# Patient Record
Sex: FEMALE | Race: WHITE | Employment: OTHER | URBAN - METROPOLITAN AREA
[De-identification: names, ages, dates, MRNs, and addresses within clinical notes are randomized per-mention and may not be internally consistent; named-entity substitution may affect disease eponyms.]

---

## 2019-12-12 PROBLEM — M25.512 CHRONIC LEFT SHOULDER PAIN: Status: ACTIVE | Noted: 2019-12-12

## 2019-12-12 PROBLEM — G89.29 CHRONIC LEFT SHOULDER PAIN: Status: ACTIVE | Noted: 2019-12-12

## 2019-12-12 PROBLEM — R05.3 CHRONIC COUGH: Status: ACTIVE | Noted: 2019-12-12

## 2019-12-12 PROBLEM — R59.0 LYMPHADENOPATHY, AXILLARY: Status: ACTIVE | Noted: 2019-12-12

## 2019-12-12 PROBLEM — E55.9 VITAMIN D DEFICIENCY: Status: ACTIVE | Noted: 2019-12-12

## 2019-12-12 PROBLEM — Z12.39 BREAST CANCER SCREENING: Status: ACTIVE | Noted: 2019-12-12

## 2019-12-12 PROBLEM — K21.9 GERD (GASTROESOPHAGEAL REFLUX DISEASE): Status: ACTIVE | Noted: 2019-12-12

## 2019-12-12 PROBLEM — E11.9 TYPE 2 DIABETES MELLITUS, WITHOUT LONG-TERM CURRENT USE OF INSULIN (HCC): Status: ACTIVE | Noted: 2019-12-12

## 2019-12-12 PROBLEM — E03.9 ACQUIRED HYPOTHYROIDISM: Status: ACTIVE | Noted: 2019-12-12

## 2019-12-12 PROBLEM — D86.9 SARCOID: Status: ACTIVE | Noted: 2019-12-12

## 2019-12-12 PROBLEM — R59.0 AXILLARY LYMPHADENOPATHY: Status: ACTIVE | Noted: 2019-12-12

## 2019-12-12 PROBLEM — E53.8 VITAMIN B12 DEFICIENCY: Status: ACTIVE | Noted: 2019-12-12

## 2019-12-12 PROBLEM — J38.00 VOCAL CORD PARALYSIS: Status: ACTIVE | Noted: 2019-12-12

## 2019-12-12 PROBLEM — I10 ESSENTIAL HYPERTENSION: Status: ACTIVE | Noted: 2019-12-12

## 2019-12-13 ENCOUNTER — HOSPITAL ENCOUNTER (OUTPATIENT)
Dept: MAMMOGRAPHY | Age: 84
Discharge: HOME | End: 2019-12-13
Attending: INTERNAL MEDICINE
Payer: MEDICARE

## 2019-12-13 DIAGNOSIS — R59.0 LYMPHADENOPATHY, AXILLARY: ICD-10-CM

## 2019-12-13 PROCEDURE — 77067 SCR MAMMO BI INCL CAD: CPT

## 2020-01-11 PROBLEM — Z12.39 BREAST CANCER SCREENING: Status: RESOLVED | Noted: 2019-12-12 | Resolved: 2020-01-11

## 2020-06-03 PROBLEM — F43.21 SITUATIONAL DEPRESSION: Status: ACTIVE | Noted: 2020-06-03

## 2020-06-16 ENCOUNTER — HOSPITAL ENCOUNTER (OUTPATIENT)
Dept: GENERAL RADIOLOGY | Age: 85
Discharge: HOME | End: 2020-06-16
Payer: MEDICARE

## 2020-06-16 DIAGNOSIS — D86.9 SARCOID: ICD-10-CM

## 2020-06-16 PROCEDURE — 71046 X-RAY EXAM CHEST 2 VIEWS: CPT

## 2020-08-17 PROBLEM — G47.33 OSA (OBSTRUCTIVE SLEEP APNEA): Status: ACTIVE | Noted: 2020-08-17

## 2020-09-01 PROBLEM — Z71.89 ADVANCED CARE PLANNING/COUNSELING DISCUSSION: Chronic | Status: ACTIVE | Noted: 2020-09-01

## 2020-09-01 PROBLEM — Z71.89 ADVANCED CARE PLANNING/COUNSELING DISCUSSION: Status: ACTIVE | Noted: 2020-09-01

## 2020-09-01 PROBLEM — Z00.00 MEDICARE ANNUAL WELLNESS VISIT, SUBSEQUENT: Status: ACTIVE | Noted: 2020-09-01

## 2020-09-01 PROBLEM — Z00.00 MEDICARE ANNUAL WELLNESS VISIT, SUBSEQUENT: Chronic | Status: ACTIVE | Noted: 2020-09-01

## 2020-10-08 PROBLEM — E78.5 DYSLIPIDEMIA: Status: ACTIVE | Noted: 2020-10-08

## 2021-01-18 ENCOUNTER — TRANSCRIBE ORDER (OUTPATIENT)
Dept: SCHEDULING | Age: 86
End: 2021-01-18

## 2021-01-18 DIAGNOSIS — Z12.31 SCREENING MAMMOGRAM FOR HIGH-RISK PATIENT: Primary | ICD-10-CM

## 2021-01-21 ENCOUNTER — HOSPITAL ENCOUNTER (OUTPATIENT)
Dept: MAMMOGRAPHY | Age: 86
Discharge: HOME | End: 2021-01-21
Attending: FAMILY MEDICINE
Payer: MEDICARE

## 2021-01-21 DIAGNOSIS — Z12.31 SCREENING MAMMOGRAM FOR HIGH-RISK PATIENT: ICD-10-CM

## 2021-01-21 PROCEDURE — 77067 SCR MAMMO BI INCL CAD: CPT

## 2021-02-16 ENCOUNTER — HOSPITAL ENCOUNTER (OUTPATIENT)
Dept: GENERAL RADIOLOGY | Age: 86
Discharge: HOME | End: 2021-02-16
Attending: FAMILY MEDICINE
Payer: MEDICARE

## 2021-02-16 DIAGNOSIS — R05.9 COUGH: ICD-10-CM

## 2021-02-16 DIAGNOSIS — D86.9 SARCOIDOSIS: ICD-10-CM

## 2021-02-16 PROCEDURE — 71046 X-RAY EXAM CHEST 2 VIEWS: CPT

## 2021-09-28 PROBLEM — M85.851 OSTEOPENIA OF NECK OF RIGHT FEMUR: Status: ACTIVE | Noted: 2021-09-28

## 2022-01-27 PROBLEM — J41.1 MUCOPURULENT CHRONIC BRONCHITIS (HCC): Status: ACTIVE | Noted: 2022-01-27

## 2022-02-04 ENCOUNTER — APPOINTMENT (OUTPATIENT)
Dept: ULTRASOUND IMAGING | Age: 87
DRG: 435 | End: 2022-02-04
Attending: FAMILY MEDICINE
Payer: MEDICARE

## 2022-02-04 ENCOUNTER — HOSPITAL ENCOUNTER (INPATIENT)
Age: 87
LOS: 5 days | Discharge: HOSPICE | DRG: 435 | End: 2022-02-09
Attending: STUDENT IN AN ORGANIZED HEALTH CARE EDUCATION/TRAINING PROGRAM | Admitting: FAMILY MEDICINE
Payer: MEDICARE

## 2022-02-04 ENCOUNTER — APPOINTMENT (OUTPATIENT)
Dept: GENERAL RADIOLOGY | Age: 87
DRG: 435 | End: 2022-02-04
Attending: STUDENT IN AN ORGANIZED HEALTH CARE EDUCATION/TRAINING PROGRAM
Payer: MEDICARE

## 2022-02-04 DIAGNOSIS — C78.02 MALIGNANT NEOPLASM METASTATIC TO BOTH LUNGS (HCC): ICD-10-CM

## 2022-02-04 DIAGNOSIS — C78.7 METASTASIS TO LIVER OF UNKNOWN ORIGIN (HCC): ICD-10-CM

## 2022-02-04 DIAGNOSIS — K76.9 LIVER LESION: ICD-10-CM

## 2022-02-04 DIAGNOSIS — D86.9 SARCOID: ICD-10-CM

## 2022-02-04 DIAGNOSIS — R63.4 UNINTENDED WEIGHT LOSS: ICD-10-CM

## 2022-02-04 DIAGNOSIS — C80.1 METASTASIS TO LIVER OF UNKNOWN ORIGIN (HCC): ICD-10-CM

## 2022-02-04 DIAGNOSIS — J18.9 PNEUMONIA OF RIGHT LOWER LOBE DUE TO INFECTIOUS ORGANISM: Primary | ICD-10-CM

## 2022-02-04 DIAGNOSIS — C78.01 MALIGNANT NEOPLASM METASTATIC TO BOTH LUNGS (HCC): ICD-10-CM

## 2022-02-04 LAB
ALBUMIN SERPL-MCNC: 2.9 G/DL (ref 3.2–4.6)
ALBUMIN/GLOB SERPL: 0.7 {RATIO} (ref 1.2–3.5)
ALP SERPL-CCNC: 280 U/L (ref 50–130)
ALT SERPL-CCNC: 30 U/L (ref 12–65)
ANION GAP SERPL CALC-SCNC: 13 MMOL/L (ref 7–16)
APPEARANCE UR: CLEAR
AST SERPL-CCNC: 29 U/L (ref 15–37)
ATRIAL RATE: 105 BPM
BACTERIA URNS QL MICRO: 0 /HPF
BASOPHILS # BLD: 0 K/UL (ref 0–0.2)
BASOPHILS NFR BLD: 0 % (ref 0–2)
BILIRUB SERPL-MCNC: 0.6 MG/DL (ref 0.2–1.1)
BILIRUB UR QL: ABNORMAL
BNP SERPL-MCNC: 711 PG/ML
BUN SERPL-MCNC: 22 MG/DL (ref 8–23)
CALCIUM SERPL-MCNC: 9.7 MG/DL (ref 8.3–10.4)
CALCULATED P AXIS, ECG09: 72 DEGREES
CALCULATED R AXIS, ECG10: 63 DEGREES
CALCULATED T AXIS, ECG11: 89 DEGREES
CHLORIDE SERPL-SCNC: 98 MMOL/L (ref 98–107)
CO2 SERPL-SCNC: 23 MMOL/L (ref 21–32)
COLOR UR: YELLOW
COVID-19 RAPID TEST, COVR: NOT DETECTED
CREAT SERPL-MCNC: 0.74 MG/DL (ref 0.6–1)
DIAGNOSIS, 93000: NORMAL
DIFFERENTIAL METHOD BLD: ABNORMAL
EOSINOPHIL # BLD: 0 K/UL (ref 0–0.8)
EOSINOPHIL NFR BLD: 0 % (ref 0.5–7.8)
ERYTHROCYTE [DISTWIDTH] IN BLOOD BY AUTOMATED COUNT: 13.5 % (ref 11.9–14.6)
EST. AVERAGE GLUCOSE BLD GHB EST-MCNC: 229 MG/DL
GLOBULIN SER CALC-MCNC: 3.9 G/DL (ref 2.3–3.5)
GLUCOSE BLD STRIP.AUTO-MCNC: 299 MG/DL (ref 65–100)
GLUCOSE SERPL-MCNC: 350 MG/DL (ref 65–100)
GLUCOSE UR STRIP.AUTO-MCNC: 1000 MG/DL
HBA1C MFR BLD: 9.6 % (ref 4.2–6.3)
HCT VFR BLD AUTO: 47.2 % (ref 35.8–46.3)
HGB BLD-MCNC: 15.8 G/DL (ref 11.7–15.4)
HGB UR QL STRIP: NEGATIVE
IMM GRANULOCYTES # BLD AUTO: 0.1 K/UL (ref 0–0.5)
IMM GRANULOCYTES NFR BLD AUTO: 1 % (ref 0–5)
KETONES UR QL STRIP.AUTO: 80 MG/DL
LACTATE SERPL-SCNC: 0.7 MMOL/L (ref 0.4–2)
LEUKOCYTE ESTERASE UR QL STRIP.AUTO: NEGATIVE
LYMPHOCYTES # BLD: 0.8 K/UL (ref 0.5–4.6)
LYMPHOCYTES NFR BLD: 6 % (ref 13–44)
MCH RBC QN AUTO: 29.3 PG (ref 26.1–32.9)
MCHC RBC AUTO-ENTMCNC: 33.5 G/DL (ref 31.4–35)
MCV RBC AUTO: 87.4 FL (ref 79.6–97.8)
MONOCYTES # BLD: 0.5 K/UL (ref 0.1–1.3)
MONOCYTES NFR BLD: 4 % (ref 4–12)
NEUTS SEG # BLD: 11.8 K/UL (ref 1.7–8.2)
NEUTS SEG NFR BLD: 89 % (ref 43–78)
NITRITE UR QL STRIP.AUTO: NEGATIVE
NRBC # BLD: 0 K/UL (ref 0–0.2)
P-R INTERVAL, ECG05: 144 MS
PH UR STRIP: 5 [PH] (ref 5–9)
PLATELET # BLD AUTO: 293 K/UL (ref 150–450)
PMV BLD AUTO: 9.7 FL (ref 9.4–12.3)
POTASSIUM SERPL-SCNC: 4.7 MMOL/L (ref 3.5–5.1)
PROCALCITONIN SERPL-MCNC: 0.13 NG/ML (ref 0–0.49)
PROT SERPL-MCNC: 6.8 G/DL (ref 6.3–8.2)
PROT UR STRIP-MCNC: NEGATIVE MG/DL
Q-T INTERVAL, ECG07: 322 MS
QRS DURATION, ECG06: 88 MS
QTC CALCULATION (BEZET), ECG08: 425 MS
RBC # BLD AUTO: 5.4 M/UL (ref 4.05–5.2)
SERVICE CMNT-IMP: ABNORMAL
SODIUM SERPL-SCNC: 134 MMOL/L (ref 136–145)
SOURCE, COVRS: NORMAL
SP GR UR REFRACTOMETRY: 1.04 (ref 1–1.02)
T4 FREE SERPL-MCNC: 1.3 NG/DL (ref 0.78–1.46)
TROPONIN-HIGH SENSITIVITY: 42.7 PG/ML (ref 0–14)
TROPONIN-HIGH SENSITIVITY: 46 PG/ML (ref 0–14)
TSH SERPL DL<=0.005 MIU/L-ACNC: 0.21 UIU/ML
UROBILINOGEN UR QL STRIP.AUTO: 0.2 EU/DL (ref 0.2–1)
VENTRICULAR RATE, ECG03: 105 BPM
WBC # BLD AUTO: 13.3 K/UL (ref 4.3–11.1)

## 2022-02-04 PROCEDURE — 81003 URINALYSIS AUTO W/O SCOPE: CPT

## 2022-02-04 PROCEDURE — 83036 HEMOGLOBIN GLYCOSYLATED A1C: CPT

## 2022-02-04 PROCEDURE — 76700 US EXAM ABDOM COMPLETE: CPT

## 2022-02-04 PROCEDURE — 65270000029 HC RM PRIVATE

## 2022-02-04 PROCEDURE — 74011250637 HC RX REV CODE- 250/637: Performed by: FAMILY MEDICINE

## 2022-02-04 PROCEDURE — 82962 GLUCOSE BLOOD TEST: CPT

## 2022-02-04 PROCEDURE — 84145 PROCALCITONIN (PCT): CPT

## 2022-02-04 PROCEDURE — 84484 ASSAY OF TROPONIN QUANT: CPT

## 2022-02-04 PROCEDURE — 83880 ASSAY OF NATRIURETIC PEPTIDE: CPT

## 2022-02-04 PROCEDURE — 84439 ASSAY OF FREE THYROXINE: CPT

## 2022-02-04 PROCEDURE — 80053 COMPREHEN METABOLIC PANEL: CPT

## 2022-02-04 PROCEDURE — 87040 BLOOD CULTURE FOR BACTERIA: CPT

## 2022-02-04 PROCEDURE — 83605 ASSAY OF LACTIC ACID: CPT

## 2022-02-04 PROCEDURE — 71046 X-RAY EXAM CHEST 2 VIEWS: CPT

## 2022-02-04 PROCEDURE — 99284 EMERGENCY DEPT VISIT MOD MDM: CPT

## 2022-02-04 PROCEDURE — 74011000250 HC RX REV CODE- 250: Performed by: FAMILY MEDICINE

## 2022-02-04 PROCEDURE — 93005 ELECTROCARDIOGRAM TRACING: CPT | Performed by: STUDENT IN AN ORGANIZED HEALTH CARE EDUCATION/TRAINING PROGRAM

## 2022-02-04 PROCEDURE — 87635 SARS-COV-2 COVID-19 AMP PRB: CPT

## 2022-02-04 PROCEDURE — 84443 ASSAY THYROID STIM HORMONE: CPT

## 2022-02-04 PROCEDURE — 74011000258 HC RX REV CODE- 258: Performed by: STUDENT IN AN ORGANIZED HEALTH CARE EDUCATION/TRAINING PROGRAM

## 2022-02-04 PROCEDURE — 74011250636 HC RX REV CODE- 250/636: Performed by: FAMILY MEDICINE

## 2022-02-04 PROCEDURE — 74011636637 HC RX REV CODE- 636/637: Performed by: FAMILY MEDICINE

## 2022-02-04 PROCEDURE — 85025 COMPLETE CBC W/AUTO DIFF WBC: CPT

## 2022-02-04 PROCEDURE — 74011250636 HC RX REV CODE- 250/636: Performed by: STUDENT IN AN ORGANIZED HEALTH CARE EDUCATION/TRAINING PROGRAM

## 2022-02-04 RX ORDER — INSULIN LISPRO 100 [IU]/ML
INJECTION, SOLUTION INTRAVENOUS; SUBCUTANEOUS
Status: DISCONTINUED | OUTPATIENT
Start: 2022-02-04 | End: 2022-02-09 | Stop reason: HOSPADM

## 2022-02-04 RX ORDER — ACETAMINOPHEN 650 MG/1
650 SUPPOSITORY RECTAL
Status: DISCONTINUED | OUTPATIENT
Start: 2022-02-04 | End: 2022-02-09 | Stop reason: HOSPADM

## 2022-02-04 RX ORDER — PREDNISONE 20 MG/1
20 TABLET ORAL
Status: DISCONTINUED | OUTPATIENT
Start: 2022-02-05 | End: 2022-02-09 | Stop reason: HOSPADM

## 2022-02-04 RX ORDER — LEVOTHYROXINE SODIUM 50 UG/1
50 TABLET ORAL
Status: DISCONTINUED | OUTPATIENT
Start: 2022-02-05 | End: 2022-02-09 | Stop reason: HOSPADM

## 2022-02-04 RX ORDER — ONDANSETRON 4 MG/1
4 TABLET, ORALLY DISINTEGRATING ORAL
Status: DISCONTINUED | OUTPATIENT
Start: 2022-02-04 | End: 2022-02-09 | Stop reason: HOSPADM

## 2022-02-04 RX ORDER — ENOXAPARIN SODIUM 100 MG/ML
40 INJECTION SUBCUTANEOUS DAILY
Status: DISCONTINUED | OUTPATIENT
Start: 2022-02-05 | End: 2022-02-09 | Stop reason: HOSPADM

## 2022-02-04 RX ORDER — FUROSEMIDE 20 MG/1
20 TABLET ORAL DAILY
Status: DISCONTINUED | OUTPATIENT
Start: 2022-02-05 | End: 2022-02-09 | Stop reason: HOSPADM

## 2022-02-04 RX ORDER — ONDANSETRON 2 MG/ML
4 INJECTION INTRAMUSCULAR; INTRAVENOUS
Status: DISCONTINUED | OUTPATIENT
Start: 2022-02-04 | End: 2022-02-09 | Stop reason: HOSPADM

## 2022-02-04 RX ORDER — GUAIFENESIN 600 MG/1
600 TABLET, EXTENDED RELEASE ORAL 2 TIMES DAILY
Status: DISCONTINUED | OUTPATIENT
Start: 2022-02-04 | End: 2022-02-09 | Stop reason: HOSPADM

## 2022-02-04 RX ORDER — ASPIRIN 81 MG/1
81 TABLET ORAL DAILY
Status: DISCONTINUED | OUTPATIENT
Start: 2022-02-05 | End: 2022-02-09 | Stop reason: HOSPADM

## 2022-02-04 RX ORDER — ACETAMINOPHEN 325 MG/1
650 TABLET ORAL
Status: DISCONTINUED | OUTPATIENT
Start: 2022-02-04 | End: 2022-02-09 | Stop reason: HOSPADM

## 2022-02-04 RX ORDER — POLYETHYLENE GLYCOL 3350 17 G/17G
17 POWDER, FOR SOLUTION ORAL DAILY PRN
Status: DISCONTINUED | OUTPATIENT
Start: 2022-02-04 | End: 2022-02-09 | Stop reason: HOSPADM

## 2022-02-04 RX ORDER — LORAZEPAM 0.5 MG/1
0.5 TABLET ORAL
Status: DISCONTINUED | OUTPATIENT
Start: 2022-02-04 | End: 2022-02-04 | Stop reason: SDUPTHER

## 2022-02-04 RX ORDER — VALSARTAN 160 MG/1
160 TABLET ORAL
Status: DISCONTINUED | OUTPATIENT
Start: 2022-02-04 | End: 2022-02-09 | Stop reason: HOSPADM

## 2022-02-04 RX ORDER — GUAIFENESIN 600 MG/1
600 TABLET, EXTENDED RELEASE ORAL 2 TIMES DAILY
Status: DISCONTINUED | OUTPATIENT
Start: 2022-02-04 | End: 2022-02-04 | Stop reason: SDUPTHER

## 2022-02-04 RX ORDER — SODIUM CHLORIDE 0.9 % (FLUSH) 0.9 %
5-40 SYRINGE (ML) INJECTION AS NEEDED
Status: DISCONTINUED | OUTPATIENT
Start: 2022-02-04 | End: 2022-02-09 | Stop reason: HOSPADM

## 2022-02-04 RX ORDER — LORAZEPAM 0.5 MG/1
0.25 TABLET ORAL
Status: DISCONTINUED | OUTPATIENT
Start: 2022-02-04 | End: 2022-02-09 | Stop reason: HOSPADM

## 2022-02-04 RX ORDER — AZITHROMYCIN 250 MG/1
500 TABLET, FILM COATED ORAL DAILY
Status: COMPLETED | OUTPATIENT
Start: 2022-02-05 | End: 2022-02-09

## 2022-02-04 RX ORDER — LANOLIN ALCOHOL/MO/W.PET/CERES
1000 CREAM (GRAM) TOPICAL DAILY
Status: DISCONTINUED | OUTPATIENT
Start: 2022-02-05 | End: 2022-02-09 | Stop reason: HOSPADM

## 2022-02-04 RX ORDER — ROSUVASTATIN CALCIUM 5 MG/1
10 TABLET, COATED ORAL
Status: DISCONTINUED | OUTPATIENT
Start: 2022-02-04 | End: 2022-02-09 | Stop reason: HOSPADM

## 2022-02-04 RX ORDER — SODIUM CHLORIDE 0.9 % (FLUSH) 0.9 %
5-40 SYRINGE (ML) INJECTION EVERY 8 HOURS
Status: DISCONTINUED | OUTPATIENT
Start: 2022-02-04 | End: 2022-02-09 | Stop reason: HOSPADM

## 2022-02-04 RX ORDER — PANTOPRAZOLE SODIUM 40 MG/1
40 TABLET, DELAYED RELEASE ORAL 2 TIMES DAILY
Status: DISCONTINUED | OUTPATIENT
Start: 2022-02-04 | End: 2022-02-09 | Stop reason: HOSPADM

## 2022-02-04 RX ORDER — FLUTICASONE PROPIONATE 110 UG/1
1 AEROSOL, METERED RESPIRATORY (INHALATION)
Status: DISCONTINUED | OUTPATIENT
Start: 2022-02-04 | End: 2022-02-09 | Stop reason: HOSPADM

## 2022-02-04 RX ORDER — DULOXETIN HYDROCHLORIDE 60 MG/1
60 CAPSULE, DELAYED RELEASE ORAL DAILY
Status: DISCONTINUED | OUTPATIENT
Start: 2022-02-05 | End: 2022-02-09 | Stop reason: HOSPADM

## 2022-02-04 RX ADMIN — ROSUVASTATIN CALCIUM 10 MG: 5 TABLET, FILM COATED ORAL at 21:27

## 2022-02-04 RX ADMIN — INSULIN LISPRO 6 UNITS: 100 INJECTION, SOLUTION INTRAVENOUS; SUBCUTANEOUS at 21:26

## 2022-02-04 RX ADMIN — CEFTRIAXONE 1 G: 1 INJECTION, POWDER, FOR SOLUTION INTRAMUSCULAR; INTRAVENOUS at 17:18

## 2022-02-04 RX ADMIN — SODIUM CHLORIDE, PRESERVATIVE FREE 10 ML: 5 INJECTION INTRAVENOUS at 17:20

## 2022-02-04 RX ADMIN — GUAIFENESIN 600 MG: 600 TABLET, EXTENDED RELEASE ORAL at 21:28

## 2022-02-04 RX ADMIN — VALSARTAN 160 MG: 160 TABLET, FILM COATED ORAL at 21:27

## 2022-02-04 RX ADMIN — AZITHROMYCIN MONOHYDRATE 500 MG: 500 INJECTION, POWDER, LYOPHILIZED, FOR SOLUTION INTRAVENOUS at 17:23

## 2022-02-04 RX ADMIN — SODIUM CHLORIDE 1000 ML: 900 INJECTION, SOLUTION INTRAVENOUS at 15:10

## 2022-02-04 RX ADMIN — SODIUM CHLORIDE, PRESERVATIVE FREE 10 ML: 5 INJECTION INTRAVENOUS at 21:39

## 2022-02-04 NOTE — ED TRIAGE NOTES
Pt arrived to ED via EMS from home. Per EMS pt c/o generalized weakness x1 month. Per EMS pt O2 sat 92% on room air and increased to 97% on 2L.     Per EMS    140/70  110  97.9

## 2022-02-04 NOTE — H&P
Hospitalist History and Physical   Admit Date:  2022  2:25 PM   Name:  Desmond Holt   Age:  81 y.o. Sex:  female  :  1935   MRN:  [id number]   Room:  HA/A    Presenting Complaint: Fatigue    Reason(s) for Admission: CAP (community acquired pneumonia) [J18.9]     History of Present Illness: Desmond Holt is a 81 y.o. female with medical history of sarcoidosis, vocal cord paralysis, hypothyroidism, hypertension, type 2 diabetes mellitus presented to ED with worsening generalized weakness. Son was at bedside and reports patient symptoms started around thanksgiving and she has been progressively worsened over the past several days. She has become increasingly fatigued and unable to do her daily activities which she used to do before. Also reports abdominal pain and described as pain is wrapping around her abdomen, associated with nausea and poor oral intake. Son's report about 20 pound unintentional weight loss since Thanksgiving. Patient is following PCP who has ordered outpatient ultrasound secondary to abdominal pain. Patient endorses ongoing cough which she attributes to her sarcoidosis. Complaining of subjective fevers. Denies chest pain, palpitation. Patient is vaccinated against COVID but has not received booster yet. In the ED patient was tachycardic. Labs notable for WBC 13.3, hemoglobin 13.8, platelets 498, sodium 134, potassium 4.7, creatinine 0.74, BUN 22, troponin XX 6 and proBNP 711. Chest x-ray shows rt lower lobe pneumonia. Hospitalist consulted for admission. Review of Systems:  10 systems reviewed and negative except as noted in HPI.   Assessment & Plan:     Community-acquired pneumonia:  Rapid Covid negative  Chest x-ray showed possible RLL  Pneumonia  Sputum culture ordered   blood cultures ordered  Start patient on ceftriaxone/azithromycin for CAP coverage  Supplemental oxygen as needed    Diabetes mellitus:   Hold oral hypoglycemic  Start patient on sliding scale  Check A1c    Abdominal pain:  Ultrasound abdomen     Elevated troponin:  Likely demand ischemia  EKG reviewed  Trend troponin  Patient currently asymptomatic    Debility/poor intake/unintentional weight loss:  PT/OT  Nutrition consult    Hypertension/hyperlipidemia:  Continue valsartan, statin and furosemide    Hypothyroidism:  Continue levothyroxine    Sarcoidosis:   At home on 20 mg prednisone daily, will continue     Depression:  Continue duloxetine      Dispo/Discharge Planning: Anticipate discharge in 2 to 3 days     Diet: ADULT DIET Regular; 3 carb choices (45 gm/meal)  VTE ppx: Lovenox  Code status: Full Code    Hospital Problems as of 2/4/2022 Date Reviewed: 12/30/2021          Codes Class Noted - Resolved POA    * (Principal) CAP (community acquired pneumonia) ICD-10-CM: J18.9  ICD-9-CM: 655  2/4/2022 - Present Unknown        Dyslipidemia ICD-10-CM: E78.5  ICD-9-CM: 272.4  10/8/2020 - Present Yes        Acquired hypothyroidism ICD-10-CM: E03.9  ICD-9-CM: 244.9  12/12/2019 - Present Yes        Type 2 diabetes mellitus, without long-term current use of insulin (HCC) ICD-10-CM: E11.9  ICD-9-CM: 250.00  12/12/2019 - Present Yes        Vocal cord paralysis ICD-10-CM: J38.00  ICD-9-CM: 478.30  12/12/2019 - Present Yes              Past History:  Past Medical History:   Diagnosis Date    Diabetes mellitus, type 2 ([address]     Hx of sarcoidosis     Left wrist dislocation 07/2021    no surgery, just re-set     Past Surgical History:   Procedure Laterality Date    HX CATARACT REMOVAL Bilateral     HX PARTIAL THYROIDECTOMY      IMPLANT BREAST SILICONE/EQ Bilateral     removed 2000      No Known Allergies   Social History     Tobacco Use    Smoking status: Never Smoker    Smokeless tobacco: Never Used   Substance Use Topics    Alcohol use: Never      Family History   Problem Relation Age of Onset    Heart Disease Mother     Heart Disease Father     Cancer Brother     Liver Disease Brother Family history reviewed and negative except as noted above. Immunization History   Administered Date(s) Administered    COVID-19, Pfizer Purple top, DILUTE for use, 12+ yrs, 30mcg/0.3mL dose 04/02/2021, 04/27/2021    Influenza High Dose Vaccine PF 10/01/2019, 10/01/2021    Influenza, Quadrivalent, Adjuvanted (>65 Yrs FLUAD QUAD 21381) 10/27/2020     Prior to Admit Medications:  Current Outpatient Medications   Medication Instructions    acetaminophen/diphenhydramine (TYLENOL PM PO) Oral    aspirin delayed-release 81 mg tablet Oral, DAILY    cpap machine kit Does Not Apply    cyanocobalamin (VITAMIN B-12) 1,000 mcg, Oral, DAILY    DISABLED PLACARD (DISABLED PLACARD) DMV Permanent. Inability to walk without the use of, or assistance from a brace, cane, crutch, or another person.  DULoxetine (CYMBALTA) 60 mg, Oral, DAILY    fluticasone furoate (Arnuity Ellipta) 100 mcg/actuation dsdv inhaler 1 Puff, Inhalation, DAILY, RINSE MOUTH WELL AFTER USE    furosemide (LASIX) 20 mg, Oral, DAILY    guaiFENesin ER (MUCINEX) 600 mg, Oral, 2 TIMES DAILY    levothyroxine (SYNTHROID) 50 mcg, Oral, DAILY BEFORE BREAKFAST    LORazepam (ATIVAN) 0.5 mg tablet 1/2 tablet twice a day    Oxygen 3 lpm with CPAP.  pantoprazole (PROTONIX) 40 mg, Oral, 2 TIMES DAILY    pioglitazone (ACTOS) 15 mg, Oral, DAILY    potassium chloride (KLOR-CON M10) 10 mEq tablet 10 mEq, Oral, DAILY    predniSONE (DELTASONE) 10 mg tablet 40mg daily x 1 week, 30mg daily x 1 week, then 20mg daily until seen back in the office.     rosuvastatin (CRESTOR) 10 mg, Oral, EVERY BEDTIME    SITagliptin (JANUVIA) 100 mg, Oral, DAILY    valsartan (DIOVAN) 160 mg, Oral, EVERY BEDTIME       Objective:     Patient Vitals for the past 24 hrs:   Temp Pulse Resp BP SpO2   02/04/22 1424 98 °F (36.7 °C) (!) 110 16 (!) 156/73 97 %     Oxygen Therapy  O2 Sat (%): 97 % (02/04/22 1424)  O2 Device: None (Room air) (02/04/22 0422)    Estimated body mass index is 20.98 kg/m² as calculated from the following:    Height as of this encounter: 5' 6\" (1.676 m). Weight as of this encounter: 59 kg (130 lb). No intake or output data in the 24 hours ending 02/04/22 1724      Physical Exam:    Blood pressure (!) 156/73, pulse (!) 110, temperature 98 °F (36.7 °C), resp. rate 16, height 5' 6\" (1.676 m), weight 59 kg (130 lb), SpO2 97 %. General:    No overt distress  Head:  Normocephalic, atraumatic  Eyes:  Sclerae appear normal.  Pupils equally round. ENT:  Nares appear normal, no drainage. Moist oral mucosa  Neck:  No restricted ROM. Trachea midline   CV:   RRR. No m/r/g. No jugular venous distension. Lungs:             RT lower lung crackles  Abdomen: Bowel sounds present. Soft, nontender, nondistended. Extremities: No cyanosis or clubbing. No edema  Skin:     No rashes and normal coloration. Warm and dry. Neuro:  CN II-XII grossly intact. Sensation intact. A&Ox3  Psych:  Normal mood and affect. I have reviewed ordered lab tests and independently visualized imaging below:    Last 24hr Labs:  Recent Results (from the past 24 hour(s))   CBC WITH AUTOMATED DIFF    Collection Time: 02/04/22  3:03 PM   Result Value Ref Range    WBC 13.3 (H) 4.3 - 11.1 K/uL    RBC 5.40 (H) 4.05 - 5.2 M/uL    HGB 15.8 (H) 11.7 - 15.4 g/dL    HCT 47.2 (H) 35.8 - 46.3 %    MCV 87.4 79.6 - 97.8 FL    MCH 29.3 26.1 - 32.9 PG    MCHC 33.5 31.4 - 35.0 g/dL    RDW 13.5 11.9 - 14.6 %    PLATELET 790 820 - 787 K/uL    MPV 9.7 9.4 - 12.3 FL    ABSOLUTE NRBC 0.00 0.0 - 0.2 K/uL    DF AUTOMATED      NEUTROPHILS 89 (H) 43 - 78 %    LYMPHOCYTES 6 (L) 13 - 44 %    MONOCYTES 4 4.0 - 12.0 %    EOSINOPHILS 0 (L) 0.5 - 7.8 %    BASOPHILS 0 0.0 - 2.0 %    IMMATURE GRANULOCYTES 1 0.0 - 5.0 %    ABS. NEUTROPHILS 11.8 (H) 1.7 - 8.2 K/UL    ABS. LYMPHOCYTES 0.8 0.5 - 4.6 K/UL    ABS. MONOCYTES 0.5 0.1 - 1.3 K/UL    ABS. EOSINOPHILS 0.0 0.0 - 0.8 K/UL    ABS. BASOPHILS 0.0 0.0 - 0.2 K/UL    ABS. IMM. GRANS. 0.1 0.0 - 0.5 K/UL   METABOLIC PANEL, COMPREHENSIVE    Collection Time: 02/04/22  3:03 PM   Result Value Ref Range    Sodium 134 (L) 136 - 145 mmol/L    Potassium 4.7 3.5 - 5.1 mmol/L    Chloride 98 98 - 107 mmol/L    CO2 23 21 - 32 mmol/L    Anion gap 13 7 - 16 mmol/L    Glucose 350 (H) 65 - 100 mg/dL    BUN 22 8 - 23 MG/DL    Creatinine 0.74 0.6 - 1.0 MG/DL    GFR est AA >60 >60 ml/min/1.73m2    GFR est non-AA >60 >60 ml/min/1.73m2    Calcium 9.7 8.3 - 10.4 MG/DL    Bilirubin, total 0.6 0.2 - 1.1 MG/DL    ALT (SGPT) 30 12 - 65 U/L    AST (SGOT) 29 15 - 37 U/L    Alk. phosphatase 280 (H) 50 - 130 U/L    Protein, total 6.8 6.3 - 8.2 g/dL    Albumin 2.9 (L) 3.2 - 4.6 g/dL    Globulin 3.9 (H) 2.3 - 3.5 g/dL    A-G Ratio 0.7 (L) 1.2 - 3.5     TSH 3RD GENERATION    Collection Time: 02/04/22  3:03 PM   Result Value Ref Range    TSH 0.212 uIU/mL   T4, FREE    Collection Time: 02/04/22  3:03 PM   Result Value Ref Range    T4, Free 1.3 0.78 - 1.46 NG/DL   NT-PRO BNP    Collection Time: 02/04/22  3:03 PM   Result Value Ref Range    NT pro- (H) <450 PG/ML   TROPONIN-HIGH SENSITIVITY    Collection Time: 02/04/22  3:03 PM   Result Value Ref Range    Troponin-High Sensitivity 46.0 (H) 0 - 14 pg/mL   COVID-19 RAPID TEST    Collection Time: 02/04/22  3:15 PM   Result Value Ref Range    Specimen source NASAL SWAB      COVID-19 rapid test Not detected NOTD     EKG, 12 LEAD, INITIAL    Collection Time: 02/04/22  3:15 PM   Result Value Ref Range    Ventricular Rate 105 BPM    Atrial Rate 105 BPM    P-R Interval 144 ms    QRS Duration 88 ms    Q-T Interval 322 ms    QTC Calculation (Bezet) 425 ms    Calculated P Axis 72 degrees    Calculated R Axis 63 degrees    Calculated T Axis 89 degrees    Diagnosis       !! AGE AND GENDER SPECIFIC ECG ANALYSIS !!   Sinus tachycardia  Nonspecific ST abnormality  No previous ECGs available  Confirmed by [facility]  MD (), ADY YAO ([id number]) on 2/4/2022 5:21:04 PM     LACTIC ACID Collection Time: 02/04/22  4:33 PM   Result Value Ref Range    Lactic acid 0.7 0.4 - 2.0 MMOL/L       All Micro Results     Procedure Component Value Units Date/Time    CULTURE, RESPIRATORY/SPUTUM/BRONCH Amye Chroman [[id number]]     Order Status: Sent Specimen: Sputum     CULTURE, BLOOD [[id number]] Collected: 02/04/22 1633    Order Status: Completed Specimen: Blood Updated: 02/04/22 1655    CULTURE, BLOOD [[id number]] Collected: 02/04/22 1635    Order Status: Completed Specimen: Blood Updated: 02/04/22 1655    COVID-19 RAPID TEST [[id number]] Collected: 02/04/22 1515    Order Status: Completed Specimen: Nasopharyngeal Updated: 02/04/22 1544     Specimen source NASAL SWAB        COVID-19 rapid test Not detected        Comment:      The specimen is NEGATIVE for SARS-CoV-2, the novel coronavirus associated with COVID-19. A negative result does not rule out COVID-19. This test has been authorized by the FDA under an Emergency Use Authorization (EUA) for use by authorized laboratories. Fact sheet for Healthcare Providers: Revert.IOdaKabbage.[email or web address].nz  Fact sheet for Patients: Revert.IOdate.co.nz       Methodology: Isothermal Nucleic Acid Amplification               Other Studies:  XR CHEST PA LAT    Result Date: 2/4/2022  CHEST X-RAY, 2 views. INDICATION:  Chest pain. TECHNIQUE: PA and lateral views. COMPARISON: February 2021. FINDINGS: Lungs: Lobar consolidation right lower lobe. Left lung is clear. . Costophrenic angles: are sharp. Heart size: is normal. Pulmonary vasculature: is unremarkable. Aorta: Unremarkable. Included portion of the upper abdomen: is unremarkable. Bones: No gross bony lesions. Other: None. Right lower lobar consolidation suggesting pneumonia.        Medications Administered     sodium chloride 0.9 % bolus infusion 1,000 mL     Admin Date  02/04/2022 Action  New Bag Dose  1,000 mL Rate  1,000 mL/hr Route  IntraVENous Administered By  Lui Gonzales RN Signed:  Basilio Astudillo MD    Part of this note may have been written by using a voice dictation software. The note has been proof read but may still contain some grammatical/other typographical errors.

## 2022-02-04 NOTE — ED NOTES
TRANSFER - OUT REPORT:    Verbal report given to Gurdeep Farmer RN on [employer]  being transferred to room 348 for routine progression of care       Report consisted of patients Situation, Background, Assessment and   Recommendations(SBAR). Information from the following report(s) SBAR was reviewed with the receiving nurse. Lines:   Peripheral IV Right Antecubital (Active)       Peripheral IV 02/04/22 Right Antecubital (Active)   Site Assessment Clean, dry, & intact 02/04/22 1504   Phlebitis Assessment 0 02/04/22 1504   Infiltration Assessment 0 02/04/22 1504   Dressing Status Clean, dry, & intact 02/04/22 1504   Dressing Type Gauze;Tape;Transparent 02/04/22 1504   Hub Color/Line Status Pink;Flushed 02/04/22 1504   Action Taken Blood drawn 02/04/22 1504        Opportunity for questions and clarification was provided.       Patient transported with:   Registered Nurse

## 2022-02-04 NOTE — ED PROVIDER NOTES
60-year-old female patient presenting with vague complaints of fatigue, weakness abdominal discomfort and swelling under her chin. Patient states she has been feeling ill for a month at least but has worsened over the past several days. Her son is now at bedside and states that patient has lost approximately 20 pounds since Thanksgiving. She has become increasingly fatigued and he states that he was concerned today that patient was so weak that \"she might not bounce back\". Patient denies quantified fever but does report occasional bouts of diaphoresis. She reports an ongoing cough and congestion related to sarcoid that she states is unchanged. She reports no nausea or vomiting. She reports difficulty passing stool at times but states when she does it is loose, free of bloody black or tarry appearance. She also reports difficulty urinating at times but denies dysuria. Patient states she is generally weak which prevents her from getting around as she normally does. She has been seen and followed by Dr. Kranthi Sparks of primary care who is ordered outpatient ultrasound secondary to elevated liver enzymes. This scheduled for next week.            Past Medical History:   Diagnosis Date    Diabetes mellitus, type 2 ([address]     Hx of sarcoidosis     Left wrist dislocation 07/2021    no surgery, just re-set       Past Surgical History:   Procedure Laterality Date    HX CATARACT REMOVAL Bilateral     HX PARTIAL THYROIDECTOMY      IMPLANT BREAST SILICONE/EQ Bilateral     removed 2000         Family History:   Problem Relation Age of Onset    Heart Disease Mother     Heart Disease Father     Cancer Brother     Liver Disease Brother        Social History     Socioeconomic History    Marital status:      Spouse name: Not on file    Number of children: Not on file    Years of education: Not on file    Highest education level: Not on file   Occupational History    Not on file   Tobacco Use    Smoking status: Never Smoker    Smokeless tobacco: Never Used   Vaping Use    Vaping Use: Never used   Substance and Sexual Activity    Alcohol use: Never    Drug use: Never    Sexual activity: Not on file   Other Topics Concern    Not on file   Social History Narrative    Not on file     Social Determinants of Health     Financial Resource Strain:     Difficulty of Paying Living Expenses: Not on file   Food Insecurity:     Worried About Running Out of Food in the Last Year: Not on file    Tamera of Food in the Last Year: Not on file   Transportation Needs:     Lack of Transportation (Medical): Not on file    Lack of Transportation (Non-Medical): Not on file   Physical Activity:     Days of Exercise per Week: Not on file    Minutes of Exercise per Session: Not on file   Stress:     Feeling of Stress : Not on file   Social Connections:     Frequency of Communication with Friends and Family: Not on file    Frequency of Social Gatherings with Friends and Family: Not on file    Attends [employer] Services: Not on file    Active Member of [address] [place] [address] or Organizations: Not on file    Attends Club or Organization Meetings: Not on file    Marital Status: Not on file   Intimate Partner Violence:     Fear of Current or Ex-Partner: Not on file    Emotionally Abused: Not on file    Physically Abused: Not on file    Sexually Abused: Not on file   Housing Stability:     Unable to Pay for Housing in the Last Year: Not on file    Number of [place] in the Last Year: Not on file    Unstable Housing in the Last Year: Not on file         ALLERGIES: Patient has no known allergies. Review of Systems   Constitutional: Positive for diaphoresis and fatigue. Negative for chills and fever. HENT: Negative for congestion, sneezing and sore throat. Eyes: Negative for visual disturbance. Respiratory: Negative for cough, chest tightness, shortness of breath and wheezing.     Cardiovascular: Negative for chest pain and leg swelling. Gastrointestinal: Positive for abdominal pain and diarrhea. Negative for blood in stool, nausea and vomiting. Endocrine: Negative for polyuria. Genitourinary: Positive for difficulty urinating. Negative for dysuria, flank pain, hematuria and urgency. Musculoskeletal: Negative for back pain, myalgias, neck pain and neck stiffness. Skin: Negative for color change and rash. Neurological: Positive for weakness. Negative for dizziness, syncope, speech difficulty, light-headedness, numbness and headaches. Psychiatric/Behavioral: Negative for behavioral problems. All other systems reviewed and are negative. Vitals:    02/04/22 1424   BP: (!) 156/73   Pulse: (!) 110   Resp: 16   Temp: 98 °F (36.7 °C)   SpO2: 97%   Weight: 59 kg (130 lb)   Height: 5' 6\" (1.676 m)            Physical Exam  Vitals and nursing note reviewed. Constitutional:       General: She is not in acute distress. Appearance: She is well-developed. She is not diaphoretic. Comments: Alert and oriented to person place and time. No acute distress, speaks in clear, fluid sentences. HENT:      Head: Normocephalic and atraumatic. Right Ear: External ear normal.      Left Ear: External ear normal.      Nose: Nose normal.   Eyes:      Pupils: Pupils are equal, round, and reactive to light. Cardiovascular:      Rate and Rhythm: Normal rate and regular rhythm. Heart sounds: Normal heart sounds. No murmur heard. No friction rub. No gallop. Pulmonary:      Effort: Pulmonary effort is normal. No respiratory distress. Breath sounds: Normal breath sounds. No stridor. No decreased breath sounds, wheezing, rhonchi or rales. Chest:      Chest wall: No tenderness. Abdominal:      General: There is no distension. Palpations: Abdomen is soft. There is no mass. Tenderness: There is no abdominal tenderness. There is no guarding or rebound. Hernia: No hernia is present.       Comments: Abdomen is soft and flat, no rebound guarding or distention. Nonfocal exam.   Musculoskeletal:         General: No tenderness or deformity. Normal range of motion. Cervical back: Normal range of motion. Skin:     General: Skin is warm and dry. Neurological:      Mental Status: She is alert and oriented to person, place, and time. Cranial Nerves: No cranial nerve deficit.           MDM  Number of Diagnoses or Management Options     Amount and/or Complexity of Data Reviewed  Clinical lab tests: reviewed and ordered  Tests in the radiology section of CPT®: reviewed and ordered  Tests in the medicine section of CPT®: ordered and reviewed  Independent visualization of images, tracings, or specimens: yes    Risk of Complications, Morbidity, and/or Mortality  Presenting problems: moderate  Diagnostic procedures: low  Management options: moderate    Patient Progress  Patient progress: stable         Procedures

## 2022-02-04 NOTE — PROGRESS NOTES
02/04/22 1756   Dual Skin Pressure Injury Assessment   Dual Skin Pressure Injury Assessment WDL   Second Care Provider (Based on [address] [place] [address]) Patience MCPHERSON   Skin Integumentary   Skin Integumentary (WDL) X    Pressure  Injury Documentation No Pressure Injury Noted-Pressure Ulcer Prevention Initiated   Skin Color Pale;Ecchymosis (comment)   Skin Condition/Temp Dry;Fragile; Poor turgor (comment)   Skin Integrity Scars (comment); Excoriation; Other (comment)  (bruises)   Turgor Epidermis thin w/ loss of subcut tissue   Hair Growth Present   Nails X   Varicosities Absent   Exceptions to WDL Thick

## 2022-02-05 ENCOUNTER — APPOINTMENT (OUTPATIENT)
Dept: CT IMAGING | Age: 87
DRG: 435 | End: 2022-02-05
Attending: FAMILY MEDICINE
Payer: MEDICARE

## 2022-02-05 LAB
ANION GAP SERPL CALC-SCNC: 9 MMOL/L (ref 7–16)
BASOPHILS # BLD: 0 K/UL (ref 0–0.2)
BASOPHILS NFR BLD: 0 % (ref 0–2)
BUN SERPL-MCNC: 19 MG/DL (ref 8–23)
CALCIUM SERPL-MCNC: 8.9 MG/DL (ref 8.3–10.4)
CEA SERPL-MCNC: 4.6 NG/ML (ref 0–3)
CHLORIDE SERPL-SCNC: 100 MMOL/L (ref 98–107)
CO2 SERPL-SCNC: 27 MMOL/L (ref 21–32)
CREAT SERPL-MCNC: 0.73 MG/DL (ref 0.6–1)
DIFFERENTIAL METHOD BLD: ABNORMAL
EOSINOPHIL # BLD: 0 K/UL (ref 0–0.8)
EOSINOPHIL NFR BLD: 0 % (ref 0.5–7.8)
ERYTHROCYTE [DISTWIDTH] IN BLOOD BY AUTOMATED COUNT: 13.8 % (ref 11.9–14.6)
GLUCOSE BLD STRIP.AUTO-MCNC: 196 MG/DL (ref 65–100)
GLUCOSE BLD STRIP.AUTO-MCNC: 259 MG/DL (ref 65–100)
GLUCOSE BLD STRIP.AUTO-MCNC: 331 MG/DL (ref 65–100)
GLUCOSE BLD STRIP.AUTO-MCNC: 357 MG/DL (ref 65–100)
GLUCOSE SERPL-MCNC: 219 MG/DL (ref 65–100)
HCT VFR BLD AUTO: 45.1 % (ref 35.8–46.3)
HGB BLD-MCNC: 14.9 G/DL (ref 11.7–15.4)
IMM GRANULOCYTES # BLD AUTO: 0.1 K/UL (ref 0–0.5)
IMM GRANULOCYTES NFR BLD AUTO: 1 % (ref 0–5)
LYMPHOCYTES # BLD: 1.9 K/UL (ref 0.5–4.6)
LYMPHOCYTES NFR BLD: 16 % (ref 13–44)
MCH RBC QN AUTO: 29.6 PG (ref 26.1–32.9)
MCHC RBC AUTO-ENTMCNC: 33 G/DL (ref 31.4–35)
MCV RBC AUTO: 89.5 FL (ref 79.6–97.8)
MONOCYTES # BLD: 0.8 K/UL (ref 0.1–1.3)
MONOCYTES NFR BLD: 7 % (ref 4–12)
NEUTS SEG # BLD: 9 K/UL (ref 1.7–8.2)
NEUTS SEG NFR BLD: 76 % (ref 43–78)
NRBC # BLD: 0 K/UL (ref 0–0.2)
PLATELET # BLD AUTO: 287 K/UL (ref 150–450)
PMV BLD AUTO: 10.2 FL (ref 9.4–12.3)
POTASSIUM SERPL-SCNC: 4.7 MMOL/L (ref 3.5–5.1)
RBC # BLD AUTO: 5.04 M/UL (ref 4.05–5.2)
SERVICE CMNT-IMP: ABNORMAL
SODIUM SERPL-SCNC: 136 MMOL/L (ref 136–145)
WBC # BLD AUTO: 11.9 K/UL (ref 4.3–11.1)

## 2022-02-05 PROCEDURE — 94760 N-INVAS EAR/PLS OXIMETRY 1: CPT

## 2022-02-05 PROCEDURE — 82962 GLUCOSE BLOOD TEST: CPT

## 2022-02-05 PROCEDURE — 77010033678 HC OXYGEN DAILY

## 2022-02-05 PROCEDURE — 82105 ALPHA-FETOPROTEIN SERUM: CPT

## 2022-02-05 PROCEDURE — 74011250637 HC RX REV CODE- 250/637: Performed by: FAMILY MEDICINE

## 2022-02-05 PROCEDURE — 82378 CARCINOEMBRYONIC ANTIGEN: CPT

## 2022-02-05 PROCEDURE — 74011636637 HC RX REV CODE- 636/637: Performed by: FAMILY MEDICINE

## 2022-02-05 PROCEDURE — 36415 COLL VENOUS BLD VENIPUNCTURE: CPT

## 2022-02-05 PROCEDURE — 74011000258 HC RX REV CODE- 258: Performed by: FAMILY MEDICINE

## 2022-02-05 PROCEDURE — 86304 IMMUNOASSAY TUMOR CA 125: CPT

## 2022-02-05 PROCEDURE — 83735 ASSAY OF MAGNESIUM: CPT

## 2022-02-05 PROCEDURE — 97165 OT EVAL LOW COMPLEX 30 MIN: CPT

## 2022-02-05 PROCEDURE — 74011000250 HC RX REV CODE- 250: Performed by: FAMILY MEDICINE

## 2022-02-05 PROCEDURE — 74011250636 HC RX REV CODE- 250/636: Performed by: FAMILY MEDICINE

## 2022-02-05 PROCEDURE — 85025 COMPLETE CBC W/AUTO DIFF WBC: CPT

## 2022-02-05 PROCEDURE — 74011000636 HC RX REV CODE- 636: Performed by: FAMILY MEDICINE

## 2022-02-05 PROCEDURE — 65270000029 HC RM PRIVATE

## 2022-02-05 PROCEDURE — 71260 CT THORAX DX C+: CPT

## 2022-02-05 PROCEDURE — 97530 THERAPEUTIC ACTIVITIES: CPT

## 2022-02-05 PROCEDURE — 80048 BASIC METABOLIC PNL TOTAL CA: CPT

## 2022-02-05 PROCEDURE — 86301 IMMUNOASSAY TUMOR CA 19-9: CPT

## 2022-02-05 PROCEDURE — 97161 PT EVAL LOW COMPLEX 20 MIN: CPT

## 2022-02-05 RX ORDER — INSULIN GLARGINE 100 [IU]/ML
10 INJECTION, SOLUTION SUBCUTANEOUS
Status: DISCONTINUED | OUTPATIENT
Start: 2022-02-05 | End: 2022-02-06

## 2022-02-05 RX ORDER — SODIUM CHLORIDE 0.9 % (FLUSH) 0.9 %
10 SYRINGE (ML) INJECTION
Status: COMPLETED | OUTPATIENT
Start: 2022-02-05 | End: 2022-02-05

## 2022-02-05 RX ADMIN — INSULIN LISPRO 10 UNITS: 100 INJECTION, SOLUTION INTRAVENOUS; SUBCUTANEOUS at 21:21

## 2022-02-05 RX ADMIN — CEFTRIAXONE SODIUM 2 G: 2 INJECTION, POWDER, FOR SOLUTION INTRAMUSCULAR; INTRAVENOUS at 17:14

## 2022-02-05 RX ADMIN — GUAIFENESIN 600 MG: 600 TABLET, EXTENDED RELEASE ORAL at 21:10

## 2022-02-05 RX ADMIN — SODIUM CHLORIDE, PRESERVATIVE FREE 10 ML: 5 INJECTION INTRAVENOUS at 21:21

## 2022-02-05 RX ADMIN — IOPAMIDOL 100 ML: 755 INJECTION, SOLUTION INTRAVENOUS at 10:02

## 2022-02-05 RX ADMIN — LEVOTHYROXINE SODIUM 50 MCG: 0.05 TABLET ORAL at 08:37

## 2022-02-05 RX ADMIN — INSULIN LISPRO 6 UNITS: 100 INJECTION, SOLUTION INTRAVENOUS; SUBCUTANEOUS at 17:14

## 2022-02-05 RX ADMIN — VALSARTAN 160 MG: 160 TABLET, FILM COATED ORAL at 21:10

## 2022-02-05 RX ADMIN — PREDNISONE 20 MG: 20 TABLET ORAL at 08:36

## 2022-02-05 RX ADMIN — GUAIFENESIN 600 MG: 600 TABLET, EXTENDED RELEASE ORAL at 08:36

## 2022-02-05 RX ADMIN — DULOXETINE HYDROCHLORIDE 60 MG: 60 CAPSULE, DELAYED RELEASE ORAL at 08:36

## 2022-02-05 RX ADMIN — Medication 10 ML: at 10:03

## 2022-02-05 RX ADMIN — PANTOPRAZOLE SODIUM 40 MG: 40 TABLET, DELAYED RELEASE ORAL at 17:14

## 2022-02-05 RX ADMIN — SODIUM CHLORIDE, PRESERVATIVE FREE 10 ML: 5 INJECTION INTRAVENOUS at 06:00

## 2022-02-05 RX ADMIN — ENOXAPARIN SODIUM 40 MG: 100 INJECTION SUBCUTANEOUS at 08:37

## 2022-02-05 RX ADMIN — PANTOPRAZOLE SODIUM 40 MG: 40 TABLET, DELAYED RELEASE ORAL at 08:36

## 2022-02-05 RX ADMIN — DIATRIZOATE MEGLUMINE AND DIATRIZOATE SODIUM 15 ML: 660; 100 LIQUID ORAL; RECTAL at 08:37

## 2022-02-05 RX ADMIN — INSULIN LISPRO 8 UNITS: 100 INJECTION, SOLUTION INTRAVENOUS; SUBCUTANEOUS at 12:28

## 2022-02-05 RX ADMIN — SODIUM CHLORIDE, PRESERVATIVE FREE 10 ML: 5 INJECTION INTRAVENOUS at 14:00

## 2022-02-05 RX ADMIN — FUROSEMIDE 20 MG: 20 TABLET ORAL at 08:36

## 2022-02-05 RX ADMIN — Medication 1000 MCG: at 08:37

## 2022-02-05 RX ADMIN — SODIUM CHLORIDE 100 ML: 9 INJECTION, SOLUTION INTRAVENOUS at 10:03

## 2022-02-05 RX ADMIN — ACETAMINOPHEN 650 MG: 325 TABLET, FILM COATED ORAL at 19:51

## 2022-02-05 RX ADMIN — INSULIN GLARGINE 10 UNITS: 100 INJECTION, SOLUTION SUBCUTANEOUS at 21:20

## 2022-02-05 RX ADMIN — ROSUVASTATIN CALCIUM 10 MG: 5 TABLET, FILM COATED ORAL at 21:10

## 2022-02-05 RX ADMIN — INSULIN LISPRO 2 UNITS: 100 INJECTION, SOLUTION INTRAVENOUS; SUBCUTANEOUS at 08:37

## 2022-02-05 RX ADMIN — AZITHROMYCIN 500 MG: 250 TABLET, FILM COATED ORAL at 08:36

## 2022-02-05 RX ADMIN — Medication 81 MG: at 08:36

## 2022-02-05 NOTE — PROGRESS NOTES
SW reviewed patient's chart and conducted a baseline assessment. Discharge plan at this time is as follows:     Care Management Interventions  PCP Verified by CM: Yes  Mode of Transport at Discharge: [address] (CM Consult): Discharge Planning  Physical Therapy Consult: Yes  Occupational Therapy Consult: Yes  Support Systems: Child(candice),Other Family Member(s)  Confirm Follow Up Transport: Family  The Plan for Transition of Care is Related to the Following Treatment Goals : PT/OT/RN  The Patient and/or Patient Representative was Provided with a Choice of Provider and Agrees with the Discharge Plan?: Yes  Name of the Patient Representative Who was Provided with a Choice of Provider and Agrees with the Discharge Plan: Marixa 10 of Choice List was Provided with Basic Dialogue that Supports the Patient's Individualized Plan of Care/Goals, Treatment Preferences and Shares the Quality Data Associated with the Providers?: Yes  Discharge Location  Patient Expects to be Discharged to[de-identified] Home with home health      *Please note that discharge plans can change throughout an inpatient admission.  Ensure that you are referring to the most recent social work/nurse case management note for current discharge plan*     Haider Wilson, [address] [place] [address] Rd Work   [facility]    * [email or web address]

## 2022-02-05 NOTE — ACP (ADVANCE CARE PLANNING)
Met with patient to talk about [address] and wishes for CPR and ventilation. Patient/family have refused to have this discussion with . Advised patient/family to let me know if they change their mind.     Nehal Hugo, ABIEL    [facility]    * [email or web address]

## 2022-02-05 NOTE — PROGRESS NOTES
SW met with patient who confirmed demographic information, states that she lives with her son Meghna Aguilar and grandchildren. Patient is seen by Dr. Oli Rainey for primary care and is current. Patient uses a rollator to ambulate, has had some falls, and states she's felt weaker. Patient discussed with therapy who is recommending STR or HH with 24/7 supervision. SW discussed recommendations with the patient who was very much so against STR, states that she would prefer to go home but is also unsure if she'd like to accept a [facility] referral. Patient states that she'll speak with her son regarding discharge plans and report back to SW in the morning.  SW offered to contact the patient's son today to discuss recommendations with him but she declined stating that she would prefer to talk to him herself first.     Haider Wilson, [address] [place] [address] Work   [facility]    * [email or web address]

## 2022-02-05 NOTE — PROGRESS NOTES
Problem: Mobility Impaired (Adult and Pediatric)  Goal: *Acute Goals and Plan of Care (Insert Text)  Outcome: Progressing Towards Goal  Note: STG:  (1.)Ms. Soila Elliott will move from supine to sit and sit to supine  with STAND BY ASSIST within 1-3 treatment day(s). (2.)Ms. Soila Elliott will transfer from bed to chair and chair to bed with MINIMAL ASSIST using the least restrictive device within 1-3 treatment day(s). (3.)Ms. Soila Elliott will ambulate with MINIMAL ASSIST for 20 feet with the least restrictive device within 1-3 treatment day(s). LTG:  (1.)Ms. Soila Elliott will move from supine to sit and sit to supine  in bed with MODIFIED INDEPENDENCE within 4-6 treatment day(s). (2.)Ms. Soila Elliott will transfer from bed to chair and chair to bed with STAND BY ASSIST using the least restrictive device within 4-6 treatment day(s). (3.)Ms. Soila Elliott will ambulate with CONTACT GUARD ASSIST for 100 feet with the least restrictive device within 4-6 treatment day(s). ________________________________________________________________________________________________      PHYSICAL THERAPY: Initial Assessment 2/5/2022  INPATIENT: PT Visit Days : 1  Payor: Dawna Vela / Plan: [place]UE [place]OSS MEDICARE PPO / Product Type: Managed Care Medicare /       NAME/AGE/GENDER: Benja Pandey is a 81 y.o. female   PRIMARY DIAGNOSIS: CAP (community acquired pneumonia) [J18.9] CAP (community acquired pneumonia) CAP (community acquired pneumonia)        ICD-10: Treatment Diagnosis:    Generalized Muscle Weakness (M62.81)  Difficulty in walking, Not elsewhere classified (R26.2)  Other abnormalities of gait and mobility (R26.89)   Precaution/Allergies:  Patient has no known allergies. ASSESSMENT:     Ms. Soila Elliott presents with generalized weakness and decreased functional mobility. She lives with her son.  She has had progressive decline since Thanksgiving and has been spending much of her time in bed over the last month. Ambulates short distances with a rollator and CGA. Weak, poor endurance. Ambulated 3' with HHA X 2 to recliner and did not feel she could go further; needed to sit down. Would like to return home with HHPT. May benefit from STR. This section established at most recent assessment   PROBLEM LIST (Impairments causing functional limitations):  Decreased Strength  Decreased ADL/Functional Activities  Decreased Transfer Abilities  Decreased Ambulation Ability/Technique  Decreased Activity Tolerance  Increased Fatigue   INTERVENTIONS PLANNED: (Benefits and precautions of physical therapy have been discussed with the patient.)  Bed Mobility  Gait Training  Therapeutic Activites  Therapeutic Exercise/Strengthening  Transfer Training     TREATMENT PLAN: Frequency/Duration: 4-5X/ week for duration of stay  Rehabilitation Potential For Stated Goals: Good     REHAB RECOMMENDATIONS (at time of discharge pending progress):    Placement: It is my opinion, based on this patient's performance to date, that Ms. Jil Yoon may benefit from [address] after discharge due to the functional deficits listed above that are likely to improve with skilled rehabilitation because he/she has multiple medical issues that affect his/her functional mobility in the community. PATIENT WOULD LIKE TO RETURN HOME with HHPT BUT WOULD BENEFIT FROM STR. Equipment:   None at this time              HISTORY:   History of Present Injury/Illness (Reason for Referral): Fede Reyna is a 81 y.o. female with medical history of sarcoidosis, vocal cord paralysis, hypothyroidism, hypertension, type 2 diabetes mellitus presented to ED with worsening generalized weakness. Son was at bedside and reports patient symptoms started around thanksgiving and she has been progressively worsened over the past several days. She has become increasingly fatigued and unable to do her daily activities which she used to do before.  Also reports abdominal pain and described as pain is wrapping around her abdomen, associated with nausea and poor oral intake. Son's report about 20 pound unintentional weight loss since Thanksgiving. Patient is following PCP who has ordered outpatient ultrasound secondary to abdominal pain. Patient endorses ongoing cough which she attributes to her sarcoidosis. Complaining of subjective fevers. Denies chest pain, palpitation. Patient is vaccinated against COVID but has not received booster yet. In the ED patient was tachycardic. Labs notable for WBC 13.3, hemoglobin 13.8, platelets 125, sodium 134, potassium 4.7, creatinine 0.74, BUN 22, troponin XX 6 and proBNP 711. Chest x-ray shows rt lower lobe pneumonia. Hospitalist consulted for admission. Past Medical History/Comorbidities:   Ms. Eva Zacarias  has a past medical history of Diabetes mellitus, type 2 ([address] sarcoidosis, and Left wrist dislocation (07/2021). Ms. Eva Zacarias  has a past surgical history that includes implant breast silicone/eq (Bilateral); hx partial thyroidectomy; and hx cataract removal (Bilateral). Social History/Living Environment:   Home Environment: Private residence  # Steps to Enter: 1  One/Two Story Residence: One story  Living Alone: No  Support Systems: Child(candice)  Patient Expects to be Discharged to[de-identified] Home with home health  Current DME Used/Available at Home: Grab bars,Walker, rollator  Prior Level of Function/Work/Activity:  Ambulating short distances with rollator and CGA, family assisting with ADLs. Number of Personal Factors/Comorbidities that affect the Plan of Care: 0: LOW COMPLEXITY   EXAMINATION:   Most Recent Physical Functioning:   Gross Assessment:  AROM: Within functional limits  Strength: Generally decreased, functional  Coordination: Generally decreased, functional               Posture:     Balance:  Sitting: Intact  Standing: Impaired;Pull to stand; With support  Standing - Static: Fair  Standing - Dynamic : Poor Bed Mobility:  Supine to Sit: Contact guard assistance  Scooting: Contact guard assistance  Wheelchair Mobility:     Transfers:  Sit to Stand: Minimum assistance;Assist x2  Stand to Sit: Minimum assistance;Assist x2  Bed to Chair: Minimum assistance;Assist x2  Gait:     Speed/Paty: Slow  Gait Abnormalities: Decreased step clearance  Distance (ft): 3 Feet (ft)  Assistive Device: Other (comment) (HHA X 2)  Ambulation - Level of Assistance: Minimal assistance;Assist x2  Interventions: Manual cues; Safety awareness training;Verbal cues  Home Environment: Private residence  Home Situation  Home Environment: Private residence  # Steps to Enter: 1  One/Two Story Residence: One story  Living Alone: No  Support Systems: Child(candice)  Patient Expects to be Discharged to[de-identified] Home with home health  Current DME Used/Available at Home: Grab bars,Walker, rollator      Body Structures Involved:  Lungs  Muscles Body Functions Affected:  Respiratory  Neuromusculoskeletal  Movement Related Activities and Participation Affected:  General Tasks and Demands  Mobility   Number of elements that affect the Plan of Care: 4+: HIGH COMPLEXITY   CLINICAL PRESENTATION:   Presentation: Stable and uncomplicated: LOW COMPLEXITY   CLINICAL DECISION MAKING:   [facility] -[facility]  How much difficulty does the patient currently have. .. Unable A Lot A Little None   1. Turning over in bed (including adjusting bedclothes, sheets and blankets)? [] 1   [] 2   [] 3   [x] 4   2. Sitting down on and standing up from a chair with arms ( e.g., wheelchair, bedside commode, etc.)   [] 1   [] 2   [x] 3   [] 4   3. Moving from lying on back to sitting on the side of the bed? [] 1   [] 2   [x] 3   [] 4   How much help from another person does the patient currently need. .. Total A Lot A Little None   4. Moving to and from a bed to a chair (including a wheelchair)? [] 1   [] 2   [x] 3   [] 4   5.   Need to walk in hospital room? [] 1   [x] 2   [] 3   [] 4   6. Climbing 3-5 steps with a railing? [] 1   [x] 2   [] 3   [] 4   © 2007, Trustees of [address] [place] [address] [address] [id number], under license to [employer]. All rights reserved      Score:  Initial: 17 Most Recent: X (Date: -- )    Interpretation of Tool:  Represents activities that are increasingly more difficult (i.e. Bed mobility, Transfers, Gait). Medical Necessity:     Patient demonstrates   fair to good   rehab potential due to higher previous functional level. Reason for Services/Other Comments:  Patient   continues to require present interventions due to patient's inability to perform functional mobility independently  . Use of outcome tool(s) and clinical judgement create a POC that gives a: Clear prediction of patient's progress: LOW COMPLEXITY            TREATMENT:   (In addition to Assessment/Re-Assessment sessions the following treatments were rendered)   Pre-treatment Symptoms/Complaints:  fatigue, no pain  Pain: Initial:   Pain Intensity 1: 0  Post Session:  0     Therapeutic Activity: (    15 minutes): Therapeutic activities including Bed transfers, Chair transfers, Ambulation on level ground, and LE exercises to improve mobility, strength, and balance. Required minimal Manual cues; Safety awareness training;Verbal cues.      Date:  2/5/22 Date:   Date:     Activity/Exercise Parameters Parameters Parameters   Ankle pumps 10     Long arc quads 10     Seated marching 10     Hip abd/adduction 10                           Braces/Orthotics/Lines/Etc:   O2 Device: None (Room air)  Treatment/Session Assessment:    Response to Treatment:  tolerated fair; fatigues easily  Interdisciplinary Collaboration:   Physical Therapist  Occupational Therapist  Registered Nurse  After treatment position/precautions:   Up in chair  Bed/Chair-wheels locked  Call light within reach  RN notified   Compliance with Program/Exercises: Compliant most of the time, Will assess as treatment progresses  Recommendations/Intent for next treatment session: \"Next visit will focus on advancements to more challenging activities\".   Total Treatment Duration:  PT Patient Time In/Time Out  Time In: 1145  Time Out: [address] Katrin [address] PT

## 2022-02-05 NOTE — CONSULTS
Comprehensive Nutrition Assessment    Type and Reason for Visit: Consult  Unintentional weight loss (hospitalist)  Poor intake/appetite 5 or more days (hospitalist)    Nutrition Recommendations/Plan:   Meals and Snacks:  Continue current diet. Nutrition Supplement Therapy:   Medical food supplement therapy:  Initiate Ensure Enlive three times per day (this provides 350 kcal and 20 grams protein per bottle) (vanilla preferred)     Malnutrition Assessment:  Malnutrition Status: Moderate malnutrition  Context: Acute illness  Findings of clinical characteristics of malnutrition:   Energy Intake:     Weight Loss:  Unable to assess (reported weight loss of 20# since Thanksgiving)     Body Fat Loss:  7 - Moderate body fat loss, Orbital   Muscle Mass Loss:  1 - Mild muscle mass loss, Clavicles (pectoralis & deltoids),Temples (temporalis)      Nutrition Assessment:   Nutrition History: per H&P poor oral intake PTA      Nutrition Background: Admitted with fatigue, abominal pain and nausea associataed with poor intake. Poor intake at home prior to admision   Nutrition Interval:  Spoke to pt at bedside, son present. Pt reports she is eating better since admission ~50% of meals.       Nutrition Related Findings:   Observed muscle and fat wasting       Current Nutrition Therapies:  ADULT DIET Regular; 3 carb choices (45 gm/meal)    Current Intake:   Average Meal Intake: 26-50%        Anthropometric Measures:  Height: 5' 6\" (167.6 cm)  Current Body Wt: 59 kg (130 lb), Weight source: Not specified (reported wt 2/4/22)  BMI: 21,       Ideal Body Weight (lbs) (Calculated): 130 lbs (59 kg), 100 %  Usual Body Wt:  , Percent weight change:            Edema: No data recorded   Estimated Daily Nutrient Needs:  Energy (kcal/day): 5774-4203 (Kcal/kg (25-30), Weight Used: Current (reported weight 2/4/22))  Protein (g/day): 59-71g (1-1.2 g/kg) Weight Used: (Current (reported weight 130# 2/4/22))  Fluid (ml/day):   (1 ml/kcal)    Nutrition Diagnosis:   · Moderate malnutrition related to other (specify) (poor PO intake ) as evidenced by other (specify) (characteristics as identified in malnutrition assessment abo)    Nutrition Interventions:   Food and/or Nutrient Delivery: Start oral nutrition supplement     Coordination of Nutrition Care: Continue to monitor while inpatient  Plan of Care discussed with patient and son. Goals: Active Goal: meet at least 75% of estimated needs     Nutrition Monitoring and Evaluation:      Food/Nutrient Intake Outcomes: Food and nutrient intake,Supplement intake       Discharge Planning:     Too soon to determine     Yashira Baker, [facility]  Disaster Mode Active

## 2022-02-05 NOTE — PROGRESS NOTES
Hospitalist Progress Note   Admit Date:  2022  2:25 PM   Name:  Luiz Gipson   Age:  81 y.o. Sex:  female  :  1935   MRN:  [id number]   Room:  [facility]    Presenting Complaint: Fatigue    Reason(s) for Admission: CAP (community acquired pneumonia) [J18.9]     Hospital Course & Interval History: Luiz Gipson is a 81 y.o. female with medical history of sarcoidosis, vocal cord paralysis, hypothyroidism, hypertension, type 2 diabetes mellitus presented to ED with worsening generalized weakness, unintentional weight loss, poor oral intake, and abdominal pain. Symptoms started apparently in 2022. Initial ED work-up suggestive of right lower lobe pneumonia and patient started on empiric antibiotic for CAP coverage. Given GI symptoms, unintentional weight loss ultrasound abdomen performed which was suggestive of metastatic liver disease. CT chest/abdomen/pelvis with contrast ordered and showed extensive pulmonary metastasis, metastatic liver disease with potential splenic metastasis. Oncology consulted. Subjective (22): Patient is seen at the bedside. Reports she is feeling okay. Denies chest pain, shortness of breath. Continue to endorse generalized weakness. Son at the bedside. Discussed ultrasound results with both patient and son, concern for metastatic disease. Patient's brother  last year secondary to liver cancer. Son appreciates to get input from oncology. All questions were answered. Assessment & Plan:     Metastatic disease, new diagnosis:  :   Patient with symptoms of generalized weakness, unintentional weight loss, poor oral intake and abdominal pain for 2 to 3 months  Ultrasound abdomen suggestive of metastatic liver disease.     CT chest/abdomen/pelvis with contrast ordered and showed extensive pulmonary metastasis, metastatic liver disease with potential splenic metastasiscancer  Check tumor marker CEA, CA 19-9, CEA and alpha-fetoprotein  Oncology consulted, appreciate recommendation    Community-acquired pneumonia:  Rapid Covid negative  Chest x-ray showed possible RLL  Pneumonia  Sputum culture ordered   blood cultures ordered  Start patient on ceftriaxone/azithromycin for CAP coverage  Supplemental oxygen as needed  2/5: Chest x-ray finding could be reflection of metastatic disease. We will continue empiric antibiotic for now. Follow-up on culture results    Diabetes mellitus:   Hold oral hypoglycemic  Start patient on sliding scale  2/5: A1c 9.6, continue sliding scale. Start patient on Lantus 10 units nightly        Elevated troponin:  Likely demand ischemia  EKG reviewed  Patient currently asymptomatic     Debility:  PT/OT     Hypertension/hyperlipidemia:  Continue valsartan, statin and furosemide     Hypothyroidism:  Continue levothyroxine     Sarcoidosis: At home on 20 mg prednisone daily, will continue      Depression:  Continue duloxetine        Dispo/Discharge Planning:  To be determined    Diet:  ADULT DIET Regular; 3 carb choices (45 gm/meal)  ADULT ORAL NUTRITION SUPPLEMENT Lunch, Dinner, Breakfast; Standard High Calorie/High Protein  DVT PPx: Lovenox  Code status: Full Code    Hospital Problems as of 2/5/2022 Date Reviewed: 12/30/2021          Codes Class Noted - Resolved POA    * (Principal) CAP (community acquired pneumonia) ICD-10-CM: J18.9  ICD-9-CM: 703  2/4/2022 - Present Unknown        Dyslipidemia ICD-10-CM: E78.5  ICD-9-CM: 272.4  10/8/2020 - Present Yes        Acquired hypothyroidism ICD-10-CM: E03.9  ICD-9-CM: 244.9  12/12/2019 - Present Yes        Type 2 diabetes mellitus, without long-term current use of insulin (HCC) ICD-10-CM: E11.9  ICD-9-CM: 250.00  12/12/2019 - Present Yes        Vocal cord paralysis ICD-10-CM: J38.00  ICD-9-CM: 478.30  12/12/2019 - Present Yes              Objective:     Patient Vitals for the past 24 hrs:   Temp Pulse Resp BP SpO2   02/05/22 1151 97.3 °F (36.3 °C) (!) 111 19 Ayo Meals ) 149/78 93 %   02/05/22 0839 -- -- -- -- 94 %   02/05/22 0834 -- -- -- -- 96 %   02/05/22 0820 97.6 °F (36.4 °C) (!) 102 19 132/69 97 %   02/05/22 0415 98.5 °F (36.9 °C) (!) 103 (!) 118 132/66 98 %   02/04/22 2250 98.3 °F (36.8 °C) (!) 111 18 134/77 96 %   02/04/22 1803 98.2 °F (36.8 °C) (!) 106 16 (!) 150/85 97 %   02/04/22 1734 -- (!) 103 -- (!) 154/74 97 %   02/04/22 1729 -- (!) 101 -- -- 98 %   02/04/22 1704 -- (!) 111 -- (!) 156/77 94 %   02/04/22 1424 98 °F (36.7 °C) (!) 110 16 (!) 156/73 97 %     Oxygen Therapy  O2 Sat (%): 93 % (02/05/22 1151)  Pulse via Oximetry: 108 beats per minute (02/05/22 0839)  O2 Device: None (Room air) (02/05/22 0839)  O2 Flow Rate (L/min): 0 l/min (02/05/22 0839)  FIO2 (%): 21 % (02/05/22 0839)    Estimated body mass index is 20.98 kg/m² as calculated from the following:    Height as of this encounter: 5' 6\" (1.676 m). Weight as of this encounter: 59 kg (130 lb). Intake/Output Summary (Last 24 hours) at 2/5/2022 1247  Last data filed at 2/5/2022 0545  Gross per 24 hour   Intake 480 ml   Output --   Net 480 ml         Physical Exam:     Blood pressure (!) 149/78, pulse (!) 111, temperature 97.3 °F (36.3 °C), resp. rate 19, height 5' 6\" (1.676 m), weight 59 kg (130 lb), SpO2 93 %. General:    No overt distress  Head:  Normocephalic, atraumatic  Eyes:  Sclerae appear normal.  Pupils equally round. ENT:  Nares appear normal, no drainage. Moist oral mucosa  Neck:  No restricted ROM. Trachea midline   CV:   RRR. No m/r/g. No jugular venous distension. Lungs:   Left lower lung crackles  Abdomen: Bowel sounds present. Soft, nontender, nondistended. Extremities: No cyanosis or clubbing. No edema  Skin:     No rashes and normal coloration. Warm and dry. Neuro:  CN II-XII grossly intact. Sensation intact. A&Ox3  Psych:  Normal mood and affect.       I have reviewed ordered lab tests and independently visualized imaging below:    Recent Labs:  Recent Results (from the past 48 hour(s))   CBC WITH AUTOMATED DIFF    Collection Time: 02/04/22  3:03 PM   Result Value Ref Range    WBC 13.3 (H) 4.3 - 11.1 K/uL    RBC 5.40 (H) 4.05 - 5.2 M/uL    HGB 15.8 (H) 11.7 - 15.4 g/dL    HCT 47.2 (H) 35.8 - 46.3 %    MCV 87.4 79.6 - 97.8 FL    MCH 29.3 26.1 - 32.9 PG    MCHC 33.5 31.4 - 35.0 g/dL    RDW 13.5 11.9 - 14.6 %    PLATELET 179 295 - 055 K/uL    MPV 9.7 9.4 - 12.3 FL    ABSOLUTE NRBC 0.00 0.0 - 0.2 K/uL    DF AUTOMATED      NEUTROPHILS 89 (H) 43 - 78 %    LYMPHOCYTES 6 (L) 13 - 44 %    MONOCYTES 4 4.0 - 12.0 %    EOSINOPHILS 0 (L) 0.5 - 7.8 %    BASOPHILS 0 0.0 - 2.0 %    IMMATURE GRANULOCYTES 1 0.0 - 5.0 %    ABS. NEUTROPHILS 11.8 (H) 1.7 - 8.2 K/UL    ABS. LYMPHOCYTES 0.8 0.5 - 4.6 K/UL    ABS. MONOCYTES 0.5 0.1 - 1.3 K/UL    ABS. EOSINOPHILS 0.0 0.0 - 0.8 K/UL    ABS. BASOPHILS 0.0 0.0 - 0.2 K/UL    ABS. IMM. GRANS. 0.1 0.0 - 0.5 K/UL   METABOLIC PANEL, COMPREHENSIVE    Collection Time: 02/04/22  3:03 PM   Result Value Ref Range    Sodium 134 (L) 136 - 145 mmol/L    Potassium 4.7 3.5 - 5.1 mmol/L    Chloride 98 98 - 107 mmol/L    CO2 23 21 - 32 mmol/L    Anion gap 13 7 - 16 mmol/L    Glucose 350 (H) 65 - 100 mg/dL    BUN 22 8 - 23 MG/DL    Creatinine 0.74 0.6 - 1.0 MG/DL    GFR est AA >60 >60 ml/min/1.73m2    GFR est non-AA >60 >60 ml/min/1.73m2    Calcium 9.7 8.3 - 10.4 MG/DL    Bilirubin, total 0.6 0.2 - 1.1 MG/DL    ALT (SGPT) 30 12 - 65 U/L    AST (SGOT) 29 15 - 37 U/L    Alk.  phosphatase 280 (H) 50 - 130 U/L    Protein, total 6.8 6.3 - 8.2 g/dL    Albumin 2.9 (L) 3.2 - 4.6 g/dL    Globulin 3.9 (H) 2.3 - 3.5 g/dL    A-G Ratio 0.7 (L) 1.2 - 3.5     TSH 3RD GENERATION    Collection Time: 02/04/22  3:03 PM   Result Value Ref Range    TSH 0.212 uIU/mL   T4, FREE    Collection Time: 02/04/22  3:03 PM   Result Value Ref Range    T4, Free 1.3 0.78 - 1.46 NG/DL   NT-PRO BNP    Collection Time: 02/04/22  3:03 PM   Result Value Ref Range    NT pro- (H) <450 PG/ML   TROPONIN-HIGH SENSITIVITY    Collection Time: 02/04/22  3:03 PM   Result Value Ref Range    Troponin-High Sensitivity 46.0 (H) 0 - 14 pg/mL   PROCALCITONIN    Collection Time: 02/04/22  3:03 PM   Result Value Ref Range    Procalcitonin 0.13 0.00 - 0.49 ng/mL   HEMOGLOBIN A1C WITH EAG    Collection Time: 02/04/22  3:03 PM   Result Value Ref Range    Hemoglobin A1c 9.6 (H) 4.20 - 6.30 %    Est. average glucose 229 mg/dL   COVID-19 RAPID TEST    Collection Time: 02/04/22  3:15 PM   Result Value Ref Range    Specimen source NASAL SWAB      COVID-19 rapid test Not detected NOTD     EKG, 12 LEAD, INITIAL    Collection Time: 02/04/22  3:15 PM   Result Value Ref Range    Ventricular Rate 105 BPM    Atrial Rate 105 BPM    P-R Interval 144 ms    QRS Duration 88 ms    Q-T Interval 322 ms    QTC Calculation (Bezet) 425 ms    Calculated P Axis 72 degrees    Calculated R Axis 63 degrees    Calculated T Axis 89 degrees    Diagnosis       !! AGE AND GENDER SPECIFIC ECG ANALYSIS !!   Sinus tachycardia  Nonspecific ST abnormality  No previous ECGs available  Confirmed by [facility]  MD ()ADY ([id number]) on 2/4/2022 5:21:04 PM     CULTURE, BLOOD    Collection Time: 02/04/22  4:33 PM    Specimen: Blood   Result Value Ref Range    Special Requests: RIGHT  Antecubital        Culture result: NO GROWTH AFTER 14 HOURS     LACTIC ACID    Collection Time: 02/04/22  4:33 PM   Result Value Ref Range    Lactic acid 0.7 0.4 - 2.0 MMOL/L   URINALYSIS W/ RFLX MICROSCOPIC    Collection Time: 02/04/22  4:35 PM   Result Value Ref Range    Color YELLOW      Appearance CLEAR      Specific gravity 1.041 (H) 1.001 - 1.023      pH (UA) 5.0 5.0 - 9.0      Protein Negative NEG mg/dL    Glucose 1,000 (A) NEG mg/dL    Ketone 80 (A) NEG mg/dL    Bilirubin SMALL (A) NEG      Blood Negative NEG      Urobilinogen 0.2 0.2 - 1.0 EU/dL    Nitrites Negative NEG      Leukocyte Esterase Negative NEG      Bacteria 0 0 /hpf   CULTURE, BLOOD    Collection Time: 02/04/22  4:35 PM Specimen: Blood   Result Value Ref Range    Special Requests: RIGHT  FOREARM        Culture result: NO GROWTH AFTER 14 HOURS     TROPONIN-HIGH SENSITIVITY    Collection Time: 02/04/22  5:27 PM   Result Value Ref Range    Troponin-High Sensitivity 42.7 (H) 0 - 14 pg/mL   GLUCOSE, POC    Collection Time: 02/04/22  9:07 PM   Result Value Ref Range    Glucose (POC) 299 (H) 65 - 100 mg/dL    Performed by [facility] [employer]    METABOLIC PANEL, BASIC    Collection Time: 02/05/22  5:21 AM   Result Value Ref Range    Sodium 136 136 - 145 mmol/L    Potassium 4.7 3.5 - 5.1 mmol/L    Chloride 100 98 - 107 mmol/L    CO2 27 21 - 32 mmol/L    Anion gap 9 7 - 16 mmol/L    Glucose 219 (H) 65 - 100 mg/dL    BUN 19 8 - 23 MG/DL    Creatinine 0.73 0.6 - 1.0 MG/DL    GFR est AA >60 >60 ml/min/1.73m2    GFR est non-AA >60 >60 ml/min/1.73m2    Calcium 8.9 8.3 - 10.4 MG/DL   CBC WITH AUTOMATED DIFF    Collection Time: 02/05/22  5:21 AM   Result Value Ref Range    WBC 11.9 (H) 4.3 - 11.1 K/uL    RBC 5.04 4.05 - 5.2 M/uL    HGB 14.9 11.7 - 15.4 g/dL    HCT 45.1 35.8 - 46.3 %    MCV 89.5 79.6 - 97.8 FL    MCH 29.6 26.1 - 32.9 PG    MCHC 33.0 31.4 - 35.0 g/dL    RDW 13.8 11.9 - 14.6 %    PLATELET 061 955 - 128 K/uL    MPV 10.2 9.4 - 12.3 FL    ABSOLUTE NRBC 0.00 0.0 - 0.2 K/uL    DF AUTOMATED      NEUTROPHILS 76 43 - 78 %    LYMPHOCYTES 16 13 - 44 %    MONOCYTES 7 4.0 - 12.0 %    EOSINOPHILS 0 (L) 0.5 - 7.8 %    BASOPHILS 0 0.0 - 2.0 %    IMMATURE GRANULOCYTES 1 0.0 - 5.0 %    ABS. NEUTROPHILS 9.0 (H) 1.7 - 8.2 K/UL    ABS. LYMPHOCYTES 1.9 0.5 - 4.6 K/UL    ABS. MONOCYTES 0.8 0.1 - 1.3 K/UL    ABS. EOSINOPHILS 0.0 0.0 - 0.8 K/UL    ABS. BASOPHILS 0.0 0.0 - 0.2 K/UL    ABS. IMM.  GRANS. 0.1 0.0 - 0.5 K/UL   GLUCOSE, POC    Collection Time: 02/05/22  7:39 AM   Result Value Ref Range    Glucose (POC) 196 (H) 65 - 100 mg/dL    Performed by Marisela    GLUCOSE, POC    Collection Time: 02/05/22 11:14 AM   Result Value Ref Range    Glucose (POC) 331 (H) 65 - 100 mg/dL    Performed by ReynaNicole        All Micro Results     Procedure Component Value Units Date/Time    CULTURE, BLOOD [[id number]] Collected: 02/04/22 1633    Order Status: Completed Specimen: Blood Updated: 02/05/22 0750     Special Requests: --        RIGHT  Antecubital       Culture result: NO GROWTH AFTER 14 HOURS       CULTURE, BLOOD [[id number]] Collected: 02/04/22 1635    Order Status: Completed Specimen: Blood Updated: 02/05/22 0750     Special Requests: --        RIGHT  FOREARM       Culture result: NO GROWTH AFTER 14 HOURS       CULTURE, RESPIRATORY/SPUTUM/BRONCH Natalie Niels STAIN [[id number]]     Order Status: Sent Specimen: Sputum     COVID-19 RAPID TEST [[id number]] Collected: 02/04/22 1515    Order Status: Completed Specimen: Nasopharyngeal Updated: 02/04/22 1544     Specimen source NASAL SWAB        COVID-19 rapid test Not detected        Comment:      The specimen is NEGATIVE for SARS-CoV-2, the novel coronavirus associated with COVID-19. A negative result does not rule out COVID-19. This test has been authorized by the FDA under an Emergency Use Authorization (EUA) for use by authorized laboratories. Fact sheet for Healthcare Providers: ConventionUpdate.co.nz  Fact sheet for Patients: ConventionUpdate.co.nz       Methodology: Isothermal Nucleic Acid Amplification               Other Studies:  XR CHEST PA LAT    Result Date: 2/4/2022  CHEST X-RAY, 2 views. INDICATION:  Chest pain. TECHNIQUE: PA and lateral views. COMPARISON: February 2021. FINDINGS: Lungs: Lobar consolidation right lower lobe. Left lung is clear. . Costophrenic angles: are sharp. Heart size: is normal. Pulmonary vasculature: is unremarkable. Aorta: Unremarkable. Included portion of the upper abdomen: is unremarkable. Bones: No gross bony lesions. Other: None. Right lower lobar consolidation suggesting pneumonia.      CT CHEST ABD PELV W CONT    Result Date: 2/5/2022  CT CHEST ABDOMEN AND PELVIS WITH CONTRAST 2/5/2022 HISTORY: Metastatic disease to liver on ultrasound TECHNIQUE: The patient received oral contrast and 100 mL Isovue-370 nonionic IV contrast. Axial images were obtained through the chest, abdomen and pelvis. Coronal reformatted images were generated. All CT scans at this facility used dose modulation, interactive reconstruction and/or weight based dosing when appropriate to reduce radiation dose to as low as reasonably achievable. Comparison: Abdominal ultrasound 2/4/2022 Findings: CHEST: Changes of a left hemithyroidectomy are present. There are multiple bilateral pulmonary nodules with bibasilar predominant distribution. Findings suggest metastatic disease. There is confluent soft tissue in the right lower lobe and right middle lobe partially encasing the right lower lobe bronchus. This measures 3.8 x 5.3 cm in axial dimensions (image 33). This may be a primary lung neoplasm or confluent metastatic disease. Pericardial effusion is present. There is a mass against the right chest wall that measures 2 7 m in diameter (image 37). This is likely adenopathy. A soft tissue mass is present against the left chest wall as well (image 46). Mitral annulus calcifications are present. ABDOMEN: Gallbladder: Gallstones in the fundus. Liver: Diffuse low-attenuation lesions compatible with metastatic disease. A representative mass in the posterior right hepatic lobe measures 3.7 cm in diameter (image 63). Pancreas: The pancreatic parenchyma enhances homogeneously. The mid body of the pancreas is truncated without visible pancreatic tail tissue Spleen: Several small indeterminate low-attenuation splenic lesions are present. Adrenal glands: Normal. Kidneys: The kidneys enhance symmetrically with contrast and there is no hydronephrosis. Bowel: The appendix is not visible. Small bowel loops are normal in caliber. Retroperitoneum:No adenopathy. Abdominal aorta is normal in caliber.   PELVIS: The uterus contains multiple calcified fibroids. There is significant distention of the urinary bladder. There is no free pelvic fluid. A few sigmoid diverticula are present. MUSCULOSKELETAL: Multilevel degenerative thoracic and lumbar spondylosis is present. There are no aggressive osseous lesions. 1. Extensive pulmonary metastases with confluent mass in the right perihilar lung base as described. 2. Multiple hepatic metastases along with potential splenic metastases 3. Adenopathy or soft tissue masses with bilateral chest walls. US ABD COMP    Result Date: 2/4/2022  ABDOMINAL  ULTRASOUND INDICATION: Abdominal pain. COMPARISON: None Transabdominal imaging of the upper abdomen was performed. FINDINGS: LIVER: 17.0 cm. Liver appears increased in echogenicity with multiple focal liver masses concerning for metastatic disease. BILE DUCTS: No intrahepatic bile duct dilatation. CBD diameter = 11 mm. GALLBLADDER: Multiple shadowing gallstones. No significant gallbladder wall thickening. PANCREAS: Not well visualized due to overlying bowel gas. SPLEEN: Normal. RIGHT KIDNEY: 10.3 cm. No mass or hydronephrosis. LEFT KIDNEY: 11.1 cm. No mass or hydronephrosis. ABDOMINAL AORTA AND IVC: Normal in size. ASCITES: No free fluid. Cholelithiasis without definite ultrasound evidence of cholecystitis. Dilated common bile duct could indicate a distally obstructing stone or pancreatic mass. Multiple hypoechoic liver lesions on a background of fatty liver infiltration suggest metastatic liver disease. Recommend follow-up CT abdomen and pelvis with contrast for further assessment.        Current Meds:  Current Facility-Administered Medications   Medication Dose Route Frequency    lip protectant (BLISTEX) ointment 1 Each  1 Each Topical PRN    aspirin delayed-release tablet 81 mg  81 mg Oral DAILY    cyanocobalamin tablet 1,000 mcg  1,000 mcg Oral DAILY    DULoxetine (CYMBALTA) capsule 60 mg  60 mg Oral DAILY    fluticasone (FLOVENT HFA) 110 mcg inhaler  1 Puff Inhalation BID RT    furosemide (LASIX) tablet 20 mg  20 mg Oral DAILY    levothyroxine (SYNTHROID) tablet 50 mcg  50 mcg Oral ACB    pantoprazole (PROTONIX) tablet 40 mg  40 mg Oral BID    predniSONE (DELTASONE) tablet 20 mg  20 mg Oral DAILY WITH BREAKFAST    rosuvastatin (CRESTOR) tablet 10 mg  10 mg Oral QHS    valsartan (DIOVAN) tablet 160 mg  160 mg Oral QHS    sodium chloride (NS) flush 5-40 mL  5-40 mL IntraVENous Q8H    sodium chloride (NS) flush 5-40 mL  5-40 mL IntraVENous PRN    acetaminophen (TYLENOL) tablet 650 mg  650 mg Oral Q6H PRN    Or    acetaminophen (TYLENOL) suppository 650 mg  650 mg Rectal Q6H PRN    polyethylene glycol (MIRALAX) packet 17 g  17 g Oral DAILY PRN    ondansetron (ZOFRAN ODT) tablet 4 mg  4 mg Oral Q8H PRN    Or    ondansetron (ZOFRAN) injection 4 mg  4 mg IntraVENous Q6H PRN    enoxaparin (LOVENOX) injection 40 mg  40 mg SubCUTAneous DAILY    cefTRIAXone (ROCEPHIN) 2 g in 0.9% sodium chloride (MBP/ADV) 50 mL MBP  2 g IntraVENous Q24H    azithromycin (ZITHROMAX) tablet 500 mg  500 mg Oral DAILY    insulin lispro (HUMALOG) injection   SubCUTAneous AC&HS    guaiFENesin ER (MUCINEX) tablet 600 mg  600 mg Oral BID    LORazepam (ATIVAN) tablet 0.25 mg  0.25 mg Oral Q12H PRN       Signed:  Ignacio Melton MD    Part of this note may have been written by using a voice dictation software. The note has been proof read but may still contain some grammatical/other typographical errors.

## 2022-02-05 NOTE — PROGRESS NOTES
Problem: Self Care Deficits Care Plan (Adult)  Goal: *Acute Goals and Plan of Care (Insert Text)  Outcome: Progressing Towards Goal  Note: 1. Patient will perform grooming with supervision. 2. Patient will perform Upper body dressing with supervision  3. Patient will perform lower body dressing with CGA  4. Patient will perform upper and lower body bathing with supervision. 5. Patient will perform toilet transfers with CGA. 6. Patient will perform shower transfer with CGA. 7. Patient will participate in 30 + minutes of ADL/ therapeutic exercise/therapeutic activity with min rest breaks to increase activity tolerance for self care. 8. Patient will perform ADL functional mobility in room with CGA. Goals to be achieved in 7 days. OCCUPATIONAL THERAPY: Initial Assessment, Daily Note, and PM 2/5/2022  INPATIENT:    Payor: BLUE CROSS MEDICARE / Plan: [place]PO / Product Type: [employer] /      NAME/AGE/GENDER: Chantelle Johnson is a 81 y.o. female   PRIMARY DIAGNOSIS:  CAP (community acquired pneumonia) [J18.9] CAP (community acquired pneumonia) CAP (community acquired pneumonia)        ICD-10: Treatment Diagnosis:    Generalized Muscle Weakness (M62.81)  Other lack of cordination (R27.8)  Difficulty in walking, Not elsewhere classified (R26.2)   Precautions/Allergies:    Patient has no known allergies. ASSESSMENT:     Ms. Raya Gonzalez admitted with above diagnosis; pt presents with generalized weakness, decreased self care and functional mobility. Pt would benefit from skilled OT to increase independence. Pt   Pt CGA with bed mobility. Pt min assist x 2 for functional transfers/mobility this am. Pt setup with adjusting pull up.       This section established at most recent assessment   PROBLEM LIST (Impairments causing functional limitations):  Decreased Strength  Decreased ADL/Functional Activities  Decreased Transfer Abilities  Decreased Ambulation Ability/Technique  Decreased Balance  Decreased Activity Tolerance   INTERVENTIONS PLANNED: (Benefits and precautions of occupational therapy have been discussed with the patient.)  Activities of daily living training  Adaptive equipment training  Balance training  Clothing management  Therapeutic activity  Therapeutic exercise     TREATMENT PLAN: Frequency/Duration: Follow patient 3x/week to address above goals. Rehabilitation Potential For Stated Goals: Good     REHAB RECOMMENDATIONS (at time of discharge pending progress):    Placement: It is my opinion, based on this patient's performance to date, that Ms. Johny Webb may benefit from participating in 1-2 additional therapy sessions in order to continue to assess for rehab potential and then make recommendation for disposition at discharge. Equipment:   None at this time              OCCUPATIONAL PROFILE AND HISTORY:   History of Present Injury/Illness (Reason for Referral):  CAP  Past Medical History/Comorbidities:   Ms. Johny Webb  has a past medical history of Diabetes mellitus, type 2 ([address] sarcoidosis, and Left wrist dislocation (07/2021). Ms. Johny Webb  has a past surgical history that includes implant breast silicone/eq (Bilateral); hx partial thyroidectomy; and hx cataract removal (Bilateral). Social History/Living Environment:   Home Environment: Private residence  One/Two Story Residence: One story  Living Alone: No  Support Systems: Child(candice)  Patient Expects to be Discharged to[de-identified] Home with family assistance  Current DME Used/Available at Home: Grab [employer]  Prior Level of Function/Work/Activity:  Independent until about a month ago. Pt uses rollator.       Number of Personal Factors/Comorbidities that affect the Plan of Care: Brief history (0):  LOW COMPLEXITY   ASSESSMENT OF OCCUPATIONAL PERFORMANCE[de-identified]   Activities of Daily Living:   Basic ADLs (From Assessment) Complex ADLs (From Assessment)   Feeding: Setup  Oral Facial Hygiene/Grooming: Setup  Bathing: Moderate assistance  Upper Body Dressing: Minimum assistance  Lower Body Dressing: Maximum assistance  Toileting: Moderate assistance     Grooming/Bathing/Dressing Activities of Daily Living     Cognitive Retraining  Safety/Judgement: Fall prevention                 Functional Transfers  Bathroom Mobility: Minimum assistance (x 2)  Toilet Transfer : Minimum assistance;Assist x2  Shower Transfer: Minimum assistance;Assist x2     Bed/Mat Mobility  Supine to Sit: Contact guard assistance  Sit to Stand: Minimum assistance;Assist x2  Stand to Sit: Minimum assistance;Assist x2  Bed to Chair: Minimum assistance;Assist x2  Scooting: Contact guard assistance     Most Recent Physical Functioning:   Gross Assessment:  AROM: Within functional limits (BUE)  Strength: Generally decreased, functional (BUE)  Coordination: Generally decreased, functional (BUE)  Tone: Normal  Sensation: Intact               Posture:     Balance:  Sitting: Intact  Standing: Impaired; With support;Pull to stand  Standing - Static: Fair  Standing - Dynamic : Poor Bed Mobility:  Supine to Sit: Contact guard assistance  Scooting: Contact guard assistance  Wheelchair Mobility:     Transfers:  Sit to Stand: Minimum assistance;Assist x2  Stand to Sit: Minimum assistance;Assist x2  Bed to Chair: Minimum assistance;Assist x2            Patient Vitals for the past 6 hrs:   BP SpO2 O2 Flow Rate (L/min) Pulse   02/05/22 0820 132/69 97 % -- (!) 102   02/05/22 0834 -- 96 % 2 l/min --   02/05/22 0839 -- 94 % 0 l/min --   02/05/22 1151 (!) 149/78 93 % -- (!) 111       Mental Status  Neurologic State: Alert  Orientation Level: Oriented X4  Cognition: Follows commands  Perception: Appears intact  Perseveration: No perseveration noted  Safety/Judgement: Fall prevention                          Physical Skills Involved:  Balance  Strength  Activity Tolerance Cognitive Skills Affected (resulting in the inability to perform in a timely and safe manner):  none Psychosocial Skills Affected:  none   Number of elements that affect the Plan of Care: 1-3:  LOW COMPLEXITY   CLINICAL DECISION MAKING:   [facility] AM-PAC 6 Clicks   Daily Activity Inpatient Short Form  How much help from another person does the patient currently need. .. Total A Lot A Little None   1. Putting on and taking off regular lower body clothing? [] 1   [x] 2   [] 3   [] 4   2. Bathing (including washing, rinsing, drying)? [] 1   [x] 2   [] 3   [] 4   3. Toileting, which includes using toilet, bedpan or urinal?   [] 1   [x] 2   [] 3   [] 4   4. Putting on and taking off regular upper body clothing? [] 1   [] 2   [x] 3   [] 4   5. Taking care of personal grooming such as brushing teeth? [] 1   [] 2   [x] 3   [] 4   6. Eating meals? [] 1   [] 2   [x] 3   [] 4   © 2007, Trustees of [facility], under license to [employer]. All rights reserved      Score:  Initial: 15 Most Recent: X (Date: -- )    Interpretation of Tool:  Represents activities that are increasingly more difficult (i.e. Bed mobility, Transfers, Gait). Medical Necessity:     Patient is expected to demonstrate progress in   strength, balance, coordination, and functional technique   to   increase independence with self care and functional mobility  . Reason for Services/Other Comments:  Patient continues to require skilled intervention due to   Decrease self care and functional mobility  . Use of outcome tool(s) and clinical judgement create a POC that gives a: LOW COMPLEXITY         TREATMENT:   (In addition to Assessment/Re-Assessment sessions the following treatments were rendered)     Pre-treatment Symptoms/Complaints:  tolerated sitting up in recliner  Pain: Initial:   Pain Intensity 1: 0  Post Session:  0     Therapeutic Activity: (15): Therapeutic activities including Bed transfers, Chair transfers, and functional transfers to improve mobility, strength, and coordination.   Required minimal Manual cues; Safety awareness training;Verbal cues to promote static balance in standing. Assessment     Braces/Orthotics/Lines/Etc:   O2 Device: None (Room air)  Treatment/Session Assessment:    Response to Treatment:  tolerated well  Interdisciplinary Collaboration:   Physical Therapist  Occupational Therapist  Registered Nurse  After treatment position/precautions:   Up in chair  Bed/Chair-wheels locked  Call light within reach  RN notified  LEs reclined    Compliance with Program/Exercises: Compliant all of the time, Will assess as treatment progresses. Recommendations/Intent for next treatment session: \"Next visit will focus on advancements to more challenging activities and reduction in assistance provided\".   Total Treatment Duration:  OT Patient Time In/Time Out  Time In: 1145  Time Out: [address] [place]

## 2022-02-06 LAB
ANION GAP SERPL CALC-SCNC: 6 MMOL/L (ref 7–16)
BASOPHILS # BLD: 0 K/UL (ref 0–0.2)
BASOPHILS NFR BLD: 0 % (ref 0–2)
BUN SERPL-MCNC: 20 MG/DL (ref 8–23)
CALCIUM SERPL-MCNC: 9 MG/DL (ref 8.3–10.4)
CHLORIDE SERPL-SCNC: 99 MMOL/L (ref 98–107)
CO2 SERPL-SCNC: 30 MMOL/L (ref 21–32)
CREAT SERPL-MCNC: 0.76 MG/DL (ref 0.6–1)
DIFFERENTIAL METHOD BLD: ABNORMAL
EOSINOPHIL # BLD: 0.1 K/UL (ref 0–0.8)
EOSINOPHIL NFR BLD: 0 % (ref 0.5–7.8)
ERYTHROCYTE [DISTWIDTH] IN BLOOD BY AUTOMATED COUNT: 13.8 % (ref 11.9–14.6)
GLUCOSE BLD STRIP.AUTO-MCNC: 166 MG/DL (ref 65–100)
GLUCOSE BLD STRIP.AUTO-MCNC: 251 MG/DL (ref 65–100)
GLUCOSE BLD STRIP.AUTO-MCNC: 256 MG/DL (ref 65–100)
GLUCOSE BLD STRIP.AUTO-MCNC: 313 MG/DL (ref 65–100)
GLUCOSE SERPL-MCNC: 161 MG/DL (ref 65–100)
HCT VFR BLD AUTO: 45.3 % (ref 35.8–46.3)
HGB BLD-MCNC: 15.1 G/DL (ref 11.7–15.4)
IMM GRANULOCYTES # BLD AUTO: 0.1 K/UL (ref 0–0.5)
IMM GRANULOCYTES NFR BLD AUTO: 1 % (ref 0–5)
LYMPHOCYTES # BLD: 2.1 K/UL (ref 0.5–4.6)
LYMPHOCYTES NFR BLD: 17 % (ref 13–44)
MAGNESIUM SERPL-MCNC: 1.9 MG/DL (ref 1.6–2.3)
MCH RBC QN AUTO: 29.3 PG (ref 26.1–32.9)
MCHC RBC AUTO-ENTMCNC: 33.3 G/DL (ref 31.4–35)
MCV RBC AUTO: 87.8 FL (ref 79.6–97.8)
MONOCYTES # BLD: 0.9 K/UL (ref 0.1–1.3)
MONOCYTES NFR BLD: 7 % (ref 4–12)
NEUTS SEG # BLD: 9.6 K/UL (ref 1.7–8.2)
NEUTS SEG NFR BLD: 75 % (ref 43–78)
NRBC # BLD: 0 K/UL (ref 0–0.2)
PLATELET # BLD AUTO: 306 K/UL (ref 150–450)
PMV BLD AUTO: 9.6 FL (ref 9.4–12.3)
POTASSIUM SERPL-SCNC: 3.6 MMOL/L (ref 3.5–5.1)
RBC # BLD AUTO: 5.16 M/UL (ref 4.05–5.2)
SERVICE CMNT-IMP: ABNORMAL
SODIUM SERPL-SCNC: 135 MMOL/L (ref 136–145)
WBC # BLD AUTO: 12.9 K/UL (ref 4.3–11.1)

## 2022-02-06 PROCEDURE — 74011000250 HC RX REV CODE- 250: Performed by: FAMILY MEDICINE

## 2022-02-06 PROCEDURE — 82962 GLUCOSE BLOOD TEST: CPT

## 2022-02-06 PROCEDURE — 80048 BASIC METABOLIC PNL TOTAL CA: CPT

## 2022-02-06 PROCEDURE — 85025 COMPLETE CBC W/AUTO DIFF WBC: CPT

## 2022-02-06 PROCEDURE — 74011250637 HC RX REV CODE- 250/637: Performed by: FAMILY MEDICINE

## 2022-02-06 PROCEDURE — 74011636637 HC RX REV CODE- 636/637: Performed by: FAMILY MEDICINE

## 2022-02-06 PROCEDURE — 99223 1ST HOSP IP/OBS HIGH 75: CPT | Performed by: INTERNAL MEDICINE

## 2022-02-06 PROCEDURE — 74011250637 HC RX REV CODE- 250/637: Performed by: NURSE PRACTITIONER

## 2022-02-06 PROCEDURE — 36415 COLL VENOUS BLD VENIPUNCTURE: CPT

## 2022-02-06 PROCEDURE — 74011250637 HC RX REV CODE- 250/637: Performed by: INTERNAL MEDICINE

## 2022-02-06 PROCEDURE — 74011000258 HC RX REV CODE- 258: Performed by: FAMILY MEDICINE

## 2022-02-06 PROCEDURE — 74011250636 HC RX REV CODE- 250/636: Performed by: FAMILY MEDICINE

## 2022-02-06 PROCEDURE — 86300 IMMUNOASSAY TUMOR CA 15-3: CPT

## 2022-02-06 PROCEDURE — 65270000029 HC RM PRIVATE

## 2022-02-06 RX ORDER — MORPHINE SULFATE 2 MG/ML
2 INJECTION, SOLUTION INTRAMUSCULAR; INTRAVENOUS
Status: DISCONTINUED | OUTPATIENT
Start: 2022-02-06 | End: 2022-02-09 | Stop reason: HOSPADM

## 2022-02-06 RX ORDER — AMOXICILLIN 250 MG
1 CAPSULE ORAL 2 TIMES DAILY
Status: DISCONTINUED | OUTPATIENT
Start: 2022-02-06 | End: 2022-02-09 | Stop reason: HOSPADM

## 2022-02-06 RX ORDER — HYDROCODONE BITARTRATE AND ACETAMINOPHEN 7.5; 325 MG/1; MG/1
1 TABLET ORAL
Status: DISCONTINUED | OUTPATIENT
Start: 2022-02-06 | End: 2022-02-09 | Stop reason: HOSPADM

## 2022-02-06 RX ORDER — INSULIN GLARGINE 100 [IU]/ML
12 INJECTION, SOLUTION SUBCUTANEOUS
Status: DISCONTINUED | OUTPATIENT
Start: 2022-02-06 | End: 2022-02-09 | Stop reason: HOSPADM

## 2022-02-06 RX ADMIN — INSULIN LISPRO 2 UNITS: 100 INJECTION, SOLUTION INTRAVENOUS; SUBCUTANEOUS at 08:51

## 2022-02-06 RX ADMIN — LEVOTHYROXINE SODIUM 50 MCG: 0.05 TABLET ORAL at 08:49

## 2022-02-06 RX ADMIN — DOCUSATE SODIUM 50 MG AND SENNOSIDES 8.6 MG 1 TABLET: 8.6; 5 TABLET, FILM COATED ORAL at 16:53

## 2022-02-06 RX ADMIN — GUAIFENESIN 600 MG: 600 TABLET, EXTENDED RELEASE ORAL at 21:09

## 2022-02-06 RX ADMIN — PANTOPRAZOLE SODIUM 40 MG: 40 TABLET, DELAYED RELEASE ORAL at 16:53

## 2022-02-06 RX ADMIN — VALSARTAN 160 MG: 160 TABLET, FILM COATED ORAL at 21:09

## 2022-02-06 RX ADMIN — INSULIN LISPRO 6 UNITS: 100 INJECTION, SOLUTION INTRAVENOUS; SUBCUTANEOUS at 16:53

## 2022-02-06 RX ADMIN — Medication 1 EACH: at 08:48

## 2022-02-06 RX ADMIN — PANTOPRAZOLE SODIUM 40 MG: 40 TABLET, DELAYED RELEASE ORAL at 08:49

## 2022-02-06 RX ADMIN — SODIUM CHLORIDE, PRESERVATIVE FREE 10 ML: 5 INJECTION INTRAVENOUS at 05:28

## 2022-02-06 RX ADMIN — CEFTRIAXONE SODIUM 2 G: 2 INJECTION, POWDER, FOR SOLUTION INTRAMUSCULAR; INTRAVENOUS at 16:53

## 2022-02-06 RX ADMIN — FUROSEMIDE 20 MG: 20 TABLET ORAL at 08:50

## 2022-02-06 RX ADMIN — POLYETHYLENE GLYCOL 3350 17 G: 17 POWDER, FOR SOLUTION ORAL at 08:48

## 2022-02-06 RX ADMIN — Medication 1000 MCG: at 08:48

## 2022-02-06 RX ADMIN — INSULIN LISPRO 6 UNITS: 100 INJECTION, SOLUTION INTRAVENOUS; SUBCUTANEOUS at 11:52

## 2022-02-06 RX ADMIN — INSULIN LISPRO 8 UNITS: 100 INJECTION, SOLUTION INTRAVENOUS; SUBCUTANEOUS at 21:09

## 2022-02-06 RX ADMIN — PREDNISONE 20 MG: 20 TABLET ORAL at 08:48

## 2022-02-06 RX ADMIN — DOCUSATE SODIUM 50 MG AND SENNOSIDES 8.6 MG 1 TABLET: 8.6; 5 TABLET, FILM COATED ORAL at 11:41

## 2022-02-06 RX ADMIN — SODIUM CHLORIDE, PRESERVATIVE FREE 10 ML: 5 INJECTION INTRAVENOUS at 22:00

## 2022-02-06 RX ADMIN — ROSUVASTATIN CALCIUM 10 MG: 5 TABLET, FILM COATED ORAL at 21:09

## 2022-02-06 RX ADMIN — AZITHROMYCIN 500 MG: 250 TABLET, FILM COATED ORAL at 08:49

## 2022-02-06 RX ADMIN — Medication 81 MG: at 08:49

## 2022-02-06 RX ADMIN — SODIUM CHLORIDE, PRESERVATIVE FREE 10 ML: 5 INJECTION INTRAVENOUS at 14:00

## 2022-02-06 RX ADMIN — GUAIFENESIN 600 MG: 600 TABLET, EXTENDED RELEASE ORAL at 08:49

## 2022-02-06 RX ADMIN — HYDROCODONE BITARTRATE AND ACETAMINOPHEN 1 TABLET: 7.5; 325 TABLET ORAL at 11:41

## 2022-02-06 RX ADMIN — ENOXAPARIN SODIUM 40 MG: 100 INJECTION SUBCUTANEOUS at 08:48

## 2022-02-06 RX ADMIN — Medication 12 UNITS: at 22:00

## 2022-02-06 RX ADMIN — DULOXETINE HYDROCHLORIDE 60 MG: 60 CAPSULE, DELAYED RELEASE ORAL at 08:48

## 2022-02-06 NOTE — PROGRESS NOTES
Hospitalist Progress Note   Admit Date:  2022  2:25 PM   Name:  Chantelle Johnson   Age:  81 y.o. Sex:  female  :  1935   MRN:  [id number]   Room:  [facility]    Presenting Complaint: Fatigue    Reason(s) for Admission: CAP (community acquired pneumonia) [J18.9]     Hospital Course & Interval History: Chantelle Johnson is a 81 y.o. female with medical history of sarcoidosis, vocal cord paralysis, hypothyroidism, hypertension, type 2 diabetes mellitus presented to ED with worsening generalized weakness, unintentional weight loss, poor oral intake, and abdominal pain. Symptoms started apparently in October/2022. Initial ED work-up suggestive of right lower lobe pneumonia and patient started on empiric antibiotic for CAP coverage. Given GI symptoms, unintentional weight loss ultrasound abdomen performed which was suggestive of metastatic liver disease. CT chest/abdomen/pelvis with contrast ordered and showed extensive pulmonary metastasis, metastatic liver disease with potential splenic metastasis. Tumor markers ordered. Oncology consulted and recommend US guided liver biopsy. IR consulted. Subjective (22): Patient is seen at the bedside. Complaining of back pain and abdominal pain. Denies chest pain, palpitation, shortness of breath, nausea, vomiting. Son at the bedside. Discussed plan of care and son and patient both agreed to proceed with liver biopsy as recommended by oncology. Assessment & Plan:     Metastatic disease, new diagnosis:  Patient with symptoms of generalized weakness, unintentional weight loss, poor oral intake and abdominal pain for 2 to 3 months  Ultrasound abdomen suggestive of metastatic liver disease.     CT chest/abdomen/pelvis with contrast ordered and showed extensive pulmonary metastasis, metastatic liver disease with potential splenic metastasiscancer  Tumor markers ordered  Oncology consulted and recommend ultrasound-guided liver biopsy  IR consulted  N.p.o. after midnight    Community-acquired pneumonia:  Rapid Covid negative  Chest x-ray showed possible RLL  Pneumonia  Sputum culture ordered   blood cultures ordered  Start patient on ceftriaxone/azithromycin for CAP coverage  Supplemental oxygen as needed  Chest x-ray finding could be reflection of metastatic disease. Continue empiric antibiotic for now. Follow-up on culture results    Diabetes mellitus:   Hold oral hypoglycemic  Sliding scale  Increase Lantus to 12 units nightly     Elevated troponin:  Likely demand ischemia  EKG reviewed  Patient currently asymptomatic     Debility:  PT/OT     Hypertension/hyperlipidemia:  Continue valsartan, statin and furosemide     Hypothyroidism:  Continue levothyroxine     Sarcoidosis: At home on 20 mg prednisone daily, will continue      Depression:  Continue duloxetine        Dispo/Discharge Planning:  To be determined    Diet:  ADULT DIET Regular; 3 carb choices (45 gm/meal)  ADULT ORAL NUTRITION SUPPLEMENT Lunch, Dinner, Breakfast; Standard High Calorie/High Protein  DVT PPx: Lovenox  Code status: Full Code    Hospital Problems as of 2/6/2022 Date Reviewed: 12/30/2021          Codes Class Noted - Resolved POA    * (Principal) CAP (community acquired pneumonia) ICD-10-CM: J18.9  ICD-9-CM: 252  2/4/2022 - Present Unknown        Dyslipidemia ICD-10-CM: E78.5  ICD-9-CM: 272.4  10/8/2020 - Present Yes        Acquired hypothyroidism ICD-10-CM: E03.9  ICD-9-CM: 244.9  12/12/2019 - Present Yes        Type 2 diabetes mellitus, without long-term current use of insulin (HCC) ICD-10-CM: E11.9  ICD-9-CM: 250.00  12/12/2019 - Present Yes        Vocal cord paralysis ICD-10-CM: J38.00  ICD-9-CM: 478.30  12/12/2019 - Present Yes              Objective:     Patient Vitals for the past 24 hrs:   Temp Pulse Resp BP SpO2   02/06/22 1154 97.4 °F (36.3 °C) (!) 110 20 127/77 92 %   02/06/22 0813 98.2 °F (36.8 °C) (!) 116 20 117/69 93 %   02/06/22 0225 98.2 °F (36.8 °C) (!) 105 18 (!) 146/79 95 %   02/05/22 2258 97.9 °F (36.6 °C) (!) 113 18 126/66 95 %   02/05/22 1931 98 °F (36.7 °C) (!) 107 19 (!) 147/85 94 %   02/05/22 1515 97.8 °F (36.6 °C) (!) 112 19 123/61 93 %     Oxygen Therapy  O2 Sat (%): 92 % (02/06/22 1154)  Pulse via Oximetry: 108 beats per minute (02/05/22 0839)  O2 Device: None (Room air) (02/06/22 0813)  O2 Flow Rate (L/min): 0 l/min (02/05/22 0839)  FIO2 (%): 21 % (02/05/22 0839)    Estimated body mass index is 20.98 kg/m² as calculated from the following:    Height as of this encounter: 5' 6\" (1.676 m). Weight as of this encounter: 59 kg (130 lb). Intake/Output Summary (Last 24 hours) at 2/6/2022 1214  Last data filed at 2/6/2022 0908  Gross per 24 hour   Intake 600 ml   Output 600 ml   Net 0 ml         Physical Exam:     Blood pressure 127/77, pulse (!) 110, temperature 97.4 °F (36.3 °C), resp. rate 20, height 5' 6\" (1.676 m), weight 59 kg (130 lb), SpO2 92 %. General:    No overt distress  Head:  Normocephalic, atraumatic  Eyes:  Sclerae appear normal.  Pupils equally round. ENT:  Nares appear normal, no drainage. Moist oral mucosa  Neck:  No restricted ROM. Trachea midline   CV:   RRR. No m/r/g. No jugular venous distension. Lungs:   Left lower lung crackles  Abdomen: Bowel sounds present. Soft, nontender, nondistended. Extremities: No cyanosis or clubbing. No edema  Skin:     No rashes and normal coloration. Warm and dry. Neuro:  CN II-XII grossly intact. Sensation intact. A&Ox3  Psych:  Normal mood and affect.       I have reviewed ordered lab tests and independently visualized imaging below:    Recent Labs:  Recent Results (from the past 48 hour(s))   CBC WITH AUTOMATED DIFF    Collection Time: 02/04/22  3:03 PM   Result Value Ref Range    WBC 13.3 (H) 4.3 - 11.1 K/uL    RBC 5.40 (H) 4.05 - 5.2 M/uL    HGB 15.8 (H) 11.7 - 15.4 g/dL    HCT 47.2 (H) 35.8 - 46.3 %    MCV 87.4 79.6 - 97.8 FL    MCH 29.3 26.1 - 32.9 PG    MCHC 33.5 31.4 - 35.0 g/dL    RDW 13.5 11.9 - 14.6 %    PLATELET 050 844 - 280 K/uL    MPV 9.7 9.4 - 12.3 FL    ABSOLUTE NRBC 0.00 0.0 - 0.2 K/uL    DF AUTOMATED      NEUTROPHILS 89 (H) 43 - 78 %    LYMPHOCYTES 6 (L) 13 - 44 %    MONOCYTES 4 4.0 - 12.0 %    EOSINOPHILS 0 (L) 0.5 - 7.8 %    BASOPHILS 0 0.0 - 2.0 %    IMMATURE GRANULOCYTES 1 0.0 - 5.0 %    ABS. NEUTROPHILS 11.8 (H) 1.7 - 8.2 K/UL    ABS. LYMPHOCYTES 0.8 0.5 - 4.6 K/UL    ABS. MONOCYTES 0.5 0.1 - 1.3 K/UL    ABS. EOSINOPHILS 0.0 0.0 - 0.8 K/UL    ABS. BASOPHILS 0.0 0.0 - 0.2 K/UL    ABS. IMM. GRANS. 0.1 0.0 - 0.5 K/UL   METABOLIC PANEL, COMPREHENSIVE    Collection Time: 02/04/22  3:03 PM   Result Value Ref Range    Sodium 134 (L) 136 - 145 mmol/L    Potassium 4.7 3.5 - 5.1 mmol/L    Chloride 98 98 - 107 mmol/L    CO2 23 21 - 32 mmol/L    Anion gap 13 7 - 16 mmol/L    Glucose 350 (H) 65 - 100 mg/dL    BUN 22 8 - 23 MG/DL    Creatinine 0.74 0.6 - 1.0 MG/DL    GFR est AA >60 >60 ml/min/1.73m2    GFR est non-AA >60 >60 ml/min/1.73m2    Calcium 9.7 8.3 - 10.4 MG/DL    Bilirubin, total 0.6 0.2 - 1.1 MG/DL    ALT (SGPT) 30 12 - 65 U/L    AST (SGOT) 29 15 - 37 U/L    Alk.  phosphatase 280 (H) 50 - 130 U/L    Protein, total 6.8 6.3 - 8.2 g/dL    Albumin 2.9 (L) 3.2 - 4.6 g/dL    Globulin 3.9 (H) 2.3 - 3.5 g/dL    A-G Ratio 0.7 (L) 1.2 - 3.5     TSH 3RD GENERATION    Collection Time: 02/04/22  3:03 PM   Result Value Ref Range    TSH 0.212 uIU/mL   T4, FREE    Collection Time: 02/04/22  3:03 PM   Result Value Ref Range    T4, Free 1.3 0.78 - 1.46 NG/DL   NT-PRO BNP    Collection Time: 02/04/22  3:03 PM   Result Value Ref Range    NT pro- (H) <450 PG/ML   TROPONIN-HIGH SENSITIVITY    Collection Time: 02/04/22  3:03 PM   Result Value Ref Range    Troponin-High Sensitivity 46.0 (H) 0 - 14 pg/mL   PROCALCITONIN    Collection Time: 02/04/22  3:03 PM   Result Value Ref Range    Procalcitonin 0.13 0.00 - 0.49 ng/mL   HEMOGLOBIN A1C WITH EAG    Collection Time: 02/04/22  3:03 PM Result Value Ref Range    Hemoglobin A1c 9.6 (H) 4.20 - 6.30 %    Est. average glucose 229 mg/dL   COVID-19 RAPID TEST    Collection Time: 02/04/22  3:15 PM   Result Value Ref Range    Specimen source NASAL SWAB      COVID-19 rapid test Not detected NOTD     EKG, 12 LEAD, INITIAL    Collection Time: 02/04/22  3:15 PM   Result Value Ref Range    Ventricular Rate 105 BPM    Atrial Rate 105 BPM    P-R Interval 144 ms    QRS Duration 88 ms    Q-T Interval 322 ms    QTC Calculation (Bezet) 425 ms    Calculated P Axis 72 degrees    Calculated R Axis 63 degrees    Calculated T Axis 89 degrees    Diagnosis       !! AGE AND GENDER SPECIFIC ECG ANALYSIS !!   Sinus tachycardia  Nonspecific ST abnormality  No previous ECGs available  Confirmed by [facility]  MD ()ADY ([id number]) on 2/4/2022 5:21:04 PM     CULTURE, BLOOD    Collection Time: 02/04/22  4:33 PM    Specimen: Blood   Result Value Ref Range    Special Requests: RIGHT  Antecubital        Culture result: NO GROWTH AFTER 14 HOURS     LACTIC ACID    Collection Time: 02/04/22  4:33 PM   Result Value Ref Range    Lactic acid 0.7 0.4 - 2.0 MMOL/L   URINALYSIS W/ RFLX MICROSCOPIC    Collection Time: 02/04/22  4:35 PM   Result Value Ref Range    Color YELLOW      Appearance CLEAR      Specific gravity 1.041 (H) 1.001 - 1.023      pH (UA) 5.0 5.0 - 9.0      Protein Negative NEG mg/dL    Glucose 1,000 (A) NEG mg/dL    Ketone 80 (A) NEG mg/dL    Bilirubin SMALL (A) NEG      Blood Negative NEG      Urobilinogen 0.2 0.2 - 1.0 EU/dL    Nitrites Negative NEG      Leukocyte Esterase Negative NEG      Bacteria 0 0 /hpf   CULTURE, BLOOD    Collection Time: 02/04/22  4:35 PM    Specimen: Blood   Result Value Ref Range    Special Requests: RIGHT  FOREARM        Culture result: NO GROWTH AFTER 14 HOURS     TROPONIN-HIGH SENSITIVITY    Collection Time: 02/04/22  5:27 PM   Result Value Ref Range    Troponin-High Sensitivity 42.7 (H) 0 - 14 pg/mL   GLUCOSE, POC    Collection Time: 02/04/22  9:07 PM   Result Value Ref Range    Glucose (POC) 299 (H) 65 - 100 mg/dL    Performed by [facility]    METABOLIC PANEL, BASIC    Collection Time: 02/05/22  5:21 AM   Result Value Ref Range    Sodium 136 136 - 145 mmol/L    Potassium 4.7 3.5 - 5.1 mmol/L    Chloride 100 98 - 107 mmol/L    CO2 27 21 - 32 mmol/L    Anion gap 9 7 - 16 mmol/L    Glucose 219 (H) 65 - 100 mg/dL    BUN 19 8 - 23 MG/DL    Creatinine 0.73 0.6 - 1.0 MG/DL    GFR est AA >60 >60 ml/min/1.73m2    GFR est non-AA >60 >60 ml/min/1.73m2    Calcium 8.9 8.3 - 10.4 MG/DL   CBC WITH AUTOMATED DIFF    Collection Time: 02/05/22  5:21 AM   Result Value Ref Range    WBC 11.9 (H) 4.3 - 11.1 K/uL    RBC 5.04 4.05 - 5.2 M/uL    HGB 14.9 11.7 - 15.4 g/dL    HCT 45.1 35.8 - 46.3 %    MCV 89.5 79.6 - 97.8 FL    MCH 29.6 26.1 - 32.9 PG    MCHC 33.0 31.4 - 35.0 g/dL    RDW 13.8 11.9 - 14.6 %    PLATELET 843 311 - 586 K/uL    MPV 10.2 9.4 - 12.3 FL    ABSOLUTE NRBC 0.00 0.0 - 0.2 K/uL    DF AUTOMATED      NEUTROPHILS 76 43 - 78 %    LYMPHOCYTES 16 13 - 44 %    MONOCYTES 7 4.0 - 12.0 %    EOSINOPHILS 0 (L) 0.5 - 7.8 %    BASOPHILS 0 0.0 - 2.0 %    IMMATURE GRANULOCYTES 1 0.0 - 5.0 %    ABS. NEUTROPHILS 9.0 (H) 1.7 - 8.2 K/UL    ABS. LYMPHOCYTES 1.9 0.5 - 4.6 K/UL    ABS. MONOCYTES 0.8 0.1 - 1.3 K/UL    ABS. EOSINOPHILS 0.0 0.0 - 0.8 K/UL    ABS. BASOPHILS 0.0 0.0 - 0.2 K/UL    ABS. IMM.  GRANS. 0.1 0.0 - 0.5 K/UL   GLUCOSE, POC    Collection Time: 02/05/22  7:39 AM   Result Value Ref Range    Glucose (POC) 196 (H) 65 - 100 mg/dL    Performed by [facility]    GLUCOSE, POC    Collection Time: 02/05/22 11:14 AM   Result Value Ref Range    Glucose (POC) 331 (H) 65 - 100 mg/dL    Performed by [facility]    CEA    Collection Time: 02/05/22  2:09 PM   Result Value Ref Range    CEA 4.6 (H) 0.0 - 3.0 ng/mL   GLUCOSE, POC    Collection Time: 02/05/22  4:27 PM   Result Value Ref Range    Glucose (POC) 259 (H) 65 - 100 mg/dL    Performed by [facility]    GLUCOSE, POC    Collection Time: 02/05/22  9:10 PM   Result Value Ref Range    Glucose (POC) 357 (H) 65 - 100 mg/dL    Performed by [facility]    METABOLIC PANEL, BASIC    Collection Time: 02/06/22  5:54 AM   Result Value Ref Range    Sodium 135 (L) 136 - 145 mmol/L    Potassium 3.6 3.5 - 5.1 mmol/L    Chloride 99 98 - 107 mmol/L    CO2 30 21 - 32 mmol/L    Anion gap 6 (L) 7 - 16 mmol/L    Glucose 161 (H) 65 - 100 mg/dL    BUN 20 8 - 23 MG/DL    Creatinine 0.76 0.6 - 1.0 MG/DL    GFR est AA >60 >60 ml/min/1.73m2    GFR est non-AA >60 >60 ml/min/1.73m2    Calcium 9.0 8.3 - 10.4 MG/DL   CBC WITH AUTOMATED DIFF    Collection Time: 02/06/22  5:54 AM   Result Value Ref Range    WBC 12.9 (H) 4.3 - 11.1 K/uL    RBC 5.16 4.05 - 5.2 M/uL    HGB 15.1 11.7 - 15.4 g/dL    HCT 45.3 35.8 - 46.3 %    MCV 87.8 79.6 - 97.8 FL    MCH 29.3 26.1 - 32.9 PG    MCHC 33.3 31.4 - 35.0 g/dL    RDW 13.8 11.9 - 14.6 %    PLATELET 068 106 - 686 K/uL    MPV 9.6 9.4 - 12.3 FL    ABSOLUTE NRBC 0.00 0.0 - 0.2 K/uL    DF AUTOMATED      NEUTROPHILS 75 43 - 78 %    LYMPHOCYTES 17 13 - 44 %    MONOCYTES 7 4.0 - 12.0 %    EOSINOPHILS 0 (L) 0.5 - 7.8 %    BASOPHILS 0 0.0 - 2.0 %    IMMATURE GRANULOCYTES 1 0.0 - 5.0 %    ABS. NEUTROPHILS 9.6 (H) 1.7 - 8.2 K/UL    ABS. LYMPHOCYTES 2.1 0.5 - 4.6 K/UL    ABS. MONOCYTES 0.9 0.1 - 1.3 K/UL    ABS. EOSINOPHILS 0.1 0.0 - 0.8 K/UL    ABS. BASOPHILS 0.0 0.0 - 0.2 K/UL    ABS. IMM.  GRANS. 0.1 0.0 - 0.5 K/UL   GLUCOSE, POC    Collection Time: 02/06/22  8:16 AM   Result Value Ref Range    Glucose (POC) 166 (H) 65 - 100 mg/dL    Performed by [facility]    GLUCOSE, POC    Collection Time: 02/06/22 11:49 AM   Result Value Ref Range    Glucose (POC) 256 (H) 65 - 100 mg/dL    Performed by [facility]        All Micro Results     Procedure Component Value Units Date/Time    CULTURE, BLOOD [[id number]] Collected: 02/04/22 1633    Order Status: Completed Specimen: Blood Updated: 02/05/22 1730     Special Requests: -- RIGHT  Antecubital       Culture result: NO GROWTH AFTER 14 HOURS       CULTURE, BLOOD [[id number]] Collected: 02/04/22 1635    Order Status: Completed Specimen: Blood Updated: 02/05/22 0750     Special Requests: --        RIGHT  FOREARM       Culture result: NO GROWTH AFTER 14 HOURS       CULTURE, RESPIRATORY/SPUTUM/BRONCH [address] STAIN [[id number]]     Order Status: Sent Specimen: Sputum     COVID-19 RAPID TEST [[id number]] Collected: 02/04/22 1515    Order Status: Completed Specimen: Nasopharyngeal Updated: 02/04/22 1544     Specimen source NASAL SWAB        COVID-19 rapid test Not detected        Comment:      The specimen is NEGATIVE for SARS-CoV-2, the novel coronavirus associated with COVID-19. A negative result does not rule out COVID-19. This test has been authorized by the FDA under an Emergency Use Authorization (EUA) for use by authorized laboratories. Fact sheet for Healthcare Providers: [employer]ita[employer]co.nz  Fact sheet for Patients: [employer]       Methodology: Isothermal Nucleic Acid Amplification               Other Studies:  No results found.     Current Meds:  Current Facility-Administered Medications   Medication Dose Route Frequency    senna-docusate (PERICOLACE) 8.6-50 mg per tablet 1 Tablet  1 Tablet Oral BID    HYDROcodone-acetaminophen (NORCO) 7.5-325 mg per tablet 1 Tablet  1 Tablet Oral Q6H PRN    morphine injection 2 mg  2 mg IntraVENous Q6H PRN    lip protectant (BLISTEX) ointment 1 Each  1 Each Topical PRN    insulin glargine (LANTUS) injection 10 Units  10 Units SubCUTAneous QHS    aspirin delayed-release tablet 81 mg  81 mg Oral DAILY    cyanocobalamin tablet 1,000 mcg  1,000 mcg Oral DAILY    DULoxetine (CYMBALTA) capsule 60 mg  60 mg Oral DAILY    fluticasone (FLOVENT HFA) 110 mcg inhaler  1 Puff Inhalation BID RT    furosemide (LASIX) tablet 20 mg  20 mg Oral DAILY    levothyroxine (SYNTHROID) tablet 50 mcg 50 mcg Oral ACB    pantoprazole (PROTONIX) tablet 40 mg  40 mg Oral BID    predniSONE (DELTASONE) tablet 20 mg  20 mg Oral DAILY WITH BREAKFAST    rosuvastatin (CRESTOR) tablet 10 mg  10 mg Oral QHS    valsartan (DIOVAN) tablet 160 mg  160 mg Oral QHS    sodium chloride (NS) flush 5-40 mL  5-40 mL IntraVENous Q8H    sodium chloride (NS) flush 5-40 mL  5-40 mL IntraVENous PRN    acetaminophen (TYLENOL) tablet 650 mg  650 mg Oral Q6H PRN    Or    acetaminophen (TYLENOL) suppository 650 mg  650 mg Rectal Q6H PRN    polyethylene glycol (MIRALAX) packet 17 g  17 g Oral DAILY PRN    ondansetron (ZOFRAN ODT) tablet 4 mg  4 mg Oral Q8H PRN    Or    ondansetron (ZOFRAN) injection 4 mg  4 mg IntraVENous Q6H PRN    enoxaparin (LOVENOX) injection 40 mg  40 mg SubCUTAneous DAILY    cefTRIAXone (ROCEPHIN) 2 g in 0.9% sodium chloride (MBP/ADV) 50 mL MBP  2 g IntraVENous Q24H    azithromycin (ZITHROMAX) tablet 500 mg  500 mg Oral DAILY    insulin lispro (HUMALOG) injection   SubCUTAneous AC&HS    guaiFENesin ER (MUCINEX) tablet 600 mg  600 mg Oral BID    LORazepam (ATIVAN) tablet 0.25 mg  0.25 mg Oral Q12H PRN       Signed:  Sheree Napoles MD    Part of this note may have been written by using a voice dictation software. The note has been proof read but may still contain some grammatical/other typographical errors.

## 2022-02-06 NOTE — PROGRESS NOTES
Patient discussed discharge plan with her son, preference is for a referral to be sent to [facility] at discharge. Patient is scheduled for a biopsy tomorrow. Patient's son states that he would like a BSC, but would like to to be delivered to the patient's home after she has discharged.      Dre Martínez LMSW    [place] [facility]    * [email or web address]

## 2022-02-06 NOTE — PROGRESS NOTES
END OF SHIFT NOTE:    Intake/Output  02/06 0701 - 02/06 1900  In: 600 [P.O.:600]  Out: 350 [Urine:350]   Voiding: YES  Catheter: No  Drain:              Stool:  0 occurrences. Emesis:  0 occurrences. VITAL SIGNS  Patient Vitals for the past 12 hrs:   Temp Pulse Resp BP SpO2   02/06/22 1604 97.5 °F (36.4 °C) (!) 109 20 (!) 148/86 95 %   02/06/22 1154 97.4 °F (36.3 °C) (!) 110 20 127/77 92 %   02/06/22 0813 98.2 °F (36.8 °C) (!) 116 20 117/69 93 %       Pain Assessment  Pain 1  Pain Scale 1: Visual (02/06/22 1445)  Pain Intensity 1: 0 (02/06/22 1445)  Patient Stated Pain Goal: 0 (02/06/22 1445)  Pain Reassessment 1: Patient resting w/respiratory rate greater than 10 (02/06/22 1445)  Pain Onset 1: acute (02/06/22 1141)  Pain Location 1: Hip;Back (02/06/22 1141)  Pain Orientation 1: Left;Right (02/06/22 1141)  Pain Description 1: Aching; Sore (02/06/22 1141)  Pain Intervention(s) 1: Medication (see MAR) (02/06/22 1141)    Ambulating  Yes    Additional Information:    - pt hasn't been able to produce sputum sample for me today  - norco given 1x for pain  - son at bedside  - IR consult placed for biopsy tomorrow. Needs to be NPO at midnight  - complains of not having BM in several days but pt also has had poor appetite.  Miralax and pericolace given  - pt tachy running low 100s      Nery Downey RN

## 2022-02-06 NOTE — CONSULTS
[facility]: Inpatient Hematology / Oncology Consult Note    Reason for Consult:   Presumed metastatic cancer  Referring Physician:  Wally Campos MD    History of Present Illness:  Ms. Johny Webb is a 81 y.o. female admitted on 2/4/2022 with a primary diagnosis of   Encounter Diagnosis   Name Primary?  Pneumonia of right lower lobe due to infectious organism Yes   . She was admitted on 2/4/22 with worsening generalized weakness, unintentional weight loss (20# in 2 months), nausea and abdominal pain. She does have a history of sarcoidosis managed by Dr. Simone Luna. She was admitted for evaluation and CT chest/abdomen/pelvis was performed, which unfortunately showed a large RML/RLL lung mass (5.3 cm in greatest dimension) with multiple smaller pulmonary nodules, as well as presumed metastases in the chest wall, liver, spleen and lymph nodes. Tumor markers are pending. Oncology is consulted for recommendations. Review of Systems:  ROS:  Constitutional: Positive for weakness, malaise, fatigue, weight loss. CV: Negative for chest pain, palpitations, edema. Respiratory: Negative for dyspnea, cough, wheezing. GI: Positive for nausea, abdominal pain.      No Known Allergies  Past Medical History:   Diagnosis Date    Diabetes mellitus, type 2 ([address]     Hx of sarcoidosis     Left wrist dislocation 07/2021    no surgery, just re-set     Past Surgical History:   Procedure Laterality Date    HX CATARACT REMOVAL Bilateral     HX PARTIAL THYROIDECTOMY      IMPLANT BREAST SILICONE/EQ Bilateral     removed 2000     Family History   Problem Relation Age of Onset    Heart Disease Mother     Heart Disease Father     Cancer Brother     Liver Disease Brother      Social History     Socioeconomic History    Marital status:      Spouse name: Not on file    Number of children: Not on file    Years of education: Not on file    Highest education level: Not on file   Occupational History    Not on file   Tobacco Use    Smoking status: Never Smoker    Smokeless tobacco: Never Used   Vaping Use    Vaping Use: Never used   Substance and Sexual Activity    Alcohol use: Never    Drug use: Never    Sexual activity: Not on file   Other Topics Concern    Not on file   Social History Narrative    Not on file     Social Determinants of Health     Financial Resource Strain:     Difficulty of Paying Living Expenses: Not on file   Food Insecurity:     Worried About Running Out of Food in the Last Year: Not on file    Tamera of Food in the Last Year: Not on file   Transportation Needs:     Lack of Transportation (Medical): Not on file    Lack of Transportation (Non-Medical):  Not on file   Physical Activity:     Days of Exercise per Week: Not on file    Minutes of Exercise per Session: Not on file   Stress:     Feeling of Stress : Not on file   Social Connections:     Frequency of Communication with Friends and Family: Not on file    Frequency of Social Gatherings with Friends and Family: Not on file    Attends [employer] Services: Not on file    Active Member of [address] [place] [address] or Organizations: Not on file    Attends Club or Organization Meetings: Not on file    Marital Status: Not on file   Intimate Partner Violence:     Fear of Current or Ex-Partner: Not on file    Emotionally Abused: Not on file    Physically Abused: Not on file    Sexually Abused: Not on file   Housing Stability:     Unable to Pay for Housing in the Last Year: Not on file    Number of [place] in the Last Year: Not on file    Unstable Housing in the Last Year: Not on file     Current Facility-Administered Medications   Medication Dose Route Frequency Provider Last Rate Last Admin    lip protectant (BLISTEX) ointment 1 Each  1 Each Topical PRN Ethelda Cranker, MD        insulin glargine (LANTUS) injection 10 Units  10 Units SubCUTAneous QHS Salud Dhillon MD        aspirin delayed-release tablet 81 mg  81 mg Oral DAILY Oksana Coon MD   81 mg at 02/05/22 5340    cyanocobalamin tablet 1,000 mcg  1,000 mcg Oral DAILY Oksana Coon MD   1,000 mcg at 02/05/22 0837    DULoxetine (CYMBALTA) capsule 60 mg  60 mg Oral DAILY Oksana Coon MD   60 mg at 02/05/22 0836    fluticasone (FLOVENT HFA) 110 mcg inhaler  1 Puff Inhalation BID RT Oksana Coon MD        furosemide (LASIX) tablet 20 mg  20 mg Oral DAILY Oksana Coon MD   20 mg at 02/05/22 0836    levothyroxine (SYNTHROID) tablet 50 mcg  50 mcg Oral ACB Oksana Coon MD   50 mcg at 02/05/22 0837    pantoprazole (PROTONIX) tablet 40 mg  40 mg Oral BID Oksana Coon MD   40 mg at 02/05/22 1714    predniSONE (DELTASONE) tablet 20 mg  20 mg Oral DAILY WITH Juliet Garrido MD   20 mg at 02/05/22 0836    rosuvastatin (CRESTOR) tablet 10 mg  10 mg Oral QHS Oksana Coon MD   10 mg at 02/04/22 2127    valsartan (DIOVAN) tablet 160 mg  160 mg Oral QHS Oksana Coon MD   160 mg at 02/04/22 2127    sodium chloride (NS) flush 5-40 mL  5-40 mL IntraVENous Q8H Oksana Coon MD   10 mL at 02/05/22 1400    sodium chloride (NS) flush 5-40 mL  5-40 mL IntraVENous PRN Oksana Coon MD        acetaminophen (TYLENOL) tablet 650 mg  650 mg Oral Q6H PRN Oksana Coon MD   650 mg at 02/05/22 1951    Or    acetaminophen (TYLENOL) suppository 650 mg  650 mg Rectal Q6H PRN Oksana Coon MD        polyethylene glycol (MIRALAX) packet 17 g  17 g Oral DAILY PRN Oksana Coon MD        ondansetron (ZOFRAN ODT) tablet 4 mg  4 mg Oral Q8H PRN Oksana Coon MD        Or    ondansetron (ZOFRAN) injection 4 mg  4 mg IntraVENous Q6H PRN Oksana Coon MD        enoxaparin (LOVENOX) injection 40 mg  40 mg SubCUTAneous DAILY Oksana Coon MD   40 mg at 02/05/22 0837    cefTRIAXone (ROCEPHIN) 2 g in 0.9% sodium chloride (MBP/ADV) 50 mL MBP  2 g IntraVENous Q24H Oksana Coon  mL/hr at 02/05/22 1714 2 g at 02/05/22 1714    azithromycin (ZITHROMAX) tablet 500 mg  500 mg Oral DAILY Oksana Coon MD 500 mg at 22 0836    insulin lispro (HUMALOG) injection   SubCUTAneous AC&HS Esha Loera MD   6 Units at 22 1714    guaiFENesin ER (MUCINEX) tablet 600 mg  600 mg Oral BID Esha Loera MD   600 mg at 22 0836    LORazepam (ATIVAN) tablet 0.25 mg  0.25 mg Oral Q12H PRN Esha Loera MD           OBJECTIVE:  Patient Vitals for the past 8 hrs:   BP Temp Pulse Resp SpO2   22 1931 (!) 147/85 98 °F (36.7 °C) (!) 107 19 94 %   22 1515 123/61 97.8 °F (36.6 °C) (!) 112 19 93 %     Temp (24hrs), Av.9 °F (36.6 °C), Min:97.3 °F (36.3 °C), Max:98.5 °F (36.9 °C)    No intake/output data recorded. Physical Exam:  Constitutional: Well developed, well nourished female in no acute distress, sitting comfortably in the hospital bed. HEENT: Normocephalic and atraumatic. Sclerae anicteric. Neck supple without JVD. No thyromegaly present. Lymph node   No palpable submandibular, cervical, supraclavicular lymph nodes. Skin Warm and dry. No bruising and no rash noted. No erythema. No pallor. Respiratory Lungs are clear to auscultation bilaterally without wheezes, rales or rhonchi, normal air exchange without accessory muscle use. CVS Normal rate, regular rhythm and normal S1 and S2. No murmurs, gallops, or rubs. Abdomen Soft, nontender and nondistended, normoactive bowel sounds. No palpable mass. No hepatosplenomegaly. Neuro Grossly nonfocal with no obvious sensory or motor deficits. MSK Normal range of motion in general.  No edema and no tenderness. Psych Appropriate mood and affect.       Labs:    Recent Results (from the past 24 hour(s))   GLUCOSE, POC    Collection Time: 22  9:07 PM   Result Value Ref Range    Glucose (POC) 299 (H) 65 - 100 mg/dL    Performed by Maico Carl    METABOLIC PANEL, BASIC    Collection Time: 22  5:21 AM   Result Value Ref Range    Sodium 136 136 - 145 mmol/L    Potassium 4.7 3.5 - 5.1 mmol/L    Chloride 100 98 - 107 mmol/L    CO2 27 21 - 32 mmol/L    Anion gap 9 7 - 16 mmol/L    Glucose 219 (H) 65 - 100 mg/dL    BUN 19 8 - 23 MG/DL    Creatinine 0.73 0.6 - 1.0 MG/DL    GFR est AA >60 >60 ml/min/1.73m2    GFR est non-AA >60 >60 ml/min/1.73m2    Calcium 8.9 8.3 - 10.4 MG/DL   CBC WITH AUTOMATED DIFF    Collection Time: 02/05/22  5:21 AM   Result Value Ref Range    WBC 11.9 (H) 4.3 - 11.1 K/uL    RBC 5.04 4.05 - 5.2 M/uL    HGB 14.9 11.7 - 15.4 g/dL    HCT 45.1 35.8 - 46.3 %    MCV 89.5 79.6 - 97.8 FL    MCH 29.6 26.1 - 32.9 PG    MCHC 33.0 31.4 - 35.0 g/dL    RDW 13.8 11.9 - 14.6 %    PLATELET 379 279 - 650 K/uL    MPV 10.2 9.4 - 12.3 FL    ABSOLUTE NRBC 0.00 0.0 - 0.2 K/uL    DF AUTOMATED      NEUTROPHILS 76 43 - 78 %    LYMPHOCYTES 16 13 - 44 %    MONOCYTES 7 4.0 - 12.0 %    EOSINOPHILS 0 (L) 0.5 - 7.8 %    BASOPHILS 0 0.0 - 2.0 %    IMMATURE GRANULOCYTES 1 0.0 - 5.0 %    ABS. NEUTROPHILS 9.0 (H) 1.7 - 8.2 K/UL    ABS. LYMPHOCYTES 1.9 0.5 - 4.6 K/UL    ABS. MONOCYTES 0.8 0.1 - 1.3 K/UL    ABS. EOSINOPHILS 0.0 0.0 - 0.8 K/UL    ABS. BASOPHILS 0.0 0.0 - 0.2 K/UL    ABS. IMM.  GRANS. 0.1 0.0 - 0.5 K/UL   GLUCOSE, POC    Collection Time: 02/05/22  7:39 AM   Result Value Ref Range    Glucose (POC) 196 (H) 65 - 100 mg/dL    Performed by [employer]    GLUCOSE, POC    Collection Time: 02/05/22 11:14 AM   Result Value Ref Range    Glucose (POC) 331 (H) 65 - 100 mg/dL    Performed by ReynaNicole    CEA    Collection Time: 02/05/22  2:09 PM   Result Value Ref Range    CEA 4.6 (H) 0.0 - 3.0 ng/mL   GLUCOSE, POC    Collection Time: 02/05/22  4:27 PM   Result Value Ref Range    Glucose (POC) 259 (H) 65 - 100 mg/dL    Performed by ReynaNicole        Imaging:      ASSESSMENT:    Principal Problem:    CAP (community acquired pneumonia) (2/4/2022)    Active Problems:    Acquired hypothyroidism (12/12/2019)      Type 2 diabetes mellitus, without long-term current use of insulin ([facility] [address] (12/12/2019)      Vocal cord paralysis (12/12/2019)      Dyslipidemia (10/8/2020)        PLAN / RECOMMENDATIONS:  Suspicious nodules in lung, liver, spleen, lymph nodes  Highly worrisome for metastatic carcinoma  She would need biopsy to characterize the origin of the malignancy further, we discussed the pros and cons of biopsy and she and her son would like to proceed  Consult IR for possible U/S guided liver biopsy  Multiple tumor markers pending, add Ca 15-3 as well    Lab studies and imaging studies (CT) were personally reviewed. Pertinent old records were reviewed. Thank you for allowing me to participate in the care of Ms. Glendy Delacruz.   We will follow with you pending biopsy result, once biopsy performed follow-up could be arranged as outpatient since it will likely take several days for all results (IHC, moleculars if needed, etc)        Hari Valdes MD  [facility]  [address] [place] [address]  Office : [phone]  Fax : [phone]

## 2022-02-06 NOTE — PROGRESS NOTES
END OF SHIFT NOTE:    Intake/Output  02/05 1901 - 02/06 0700  In: -   Out: 200 [Urine:200]   Voiding: YES  Catheter: YES  Drain:              Stool:  0 occurrences. Emesis:  0 occurrences. VITAL SIGNS  Patient Vitals for the past 12 hrs:   Temp Pulse Resp BP SpO2   02/06/22 0225 98.2 °F (36.8 °C) (!) 105 18 (!) 146/79 95 %   02/05/22 2258 97.9 °F (36.6 °C) (!) 113 18 126/66 95 %   02/05/22 1931 98 °F (36.7 °C) (!) 107 19 (!) 147/85 94 %       Pain Assessment  Pain 1  Pain Scale 1: Numeric (0 - 10) (02/06/22 0339)  Pain Intensity 1: 0 (02/06/22 0339)  Patient Stated Pain Goal: 0 (02/06/22 0339)  Pain Reassessment 1: Patient resting w/respiratory rate greater than 10 (02/05/22 2020)  Pain Onset 1: last month (02/04/22 1805)  Pain Location 1: Rib cage (02/04/22 1805)  Pain Orientation 1: Inner (02/04/22 1805)  Pain Description 1: Aching;Sore;Sharp (02/04/22 1805)  Pain Intervention(s) 1: Medication (see MAR) (02/05/22 1940)    Ambulating  No    Additional Information: gave prn tylenol x1 for back ache. Pt resting with no complaints at this time. Shift report given to oncoming nurse at the bedside.     Alex Kam RN

## 2022-02-07 ENCOUNTER — HOSPITAL ENCOUNTER (OUTPATIENT)
Dept: ULTRASOUND IMAGING | Age: 87
Discharge: HOME | End: 2022-02-07
Attending: FAMILY MEDICINE
Payer: MEDICARE

## 2022-02-07 ENCOUNTER — APPOINTMENT (OUTPATIENT)
Dept: ULTRASOUND IMAGING | Age: 87
End: 2022-02-07
Attending: FAMILY MEDICINE

## 2022-02-07 VITALS
OXYGEN SATURATION: 95 % | HEART RATE: 116 BPM | DIASTOLIC BLOOD PRESSURE: 58 MMHG | SYSTOLIC BLOOD PRESSURE: 125 MMHG | TEMPERATURE: 98.7 F | RESPIRATION RATE: 16 BRPM

## 2022-02-07 PROBLEM — E44.0 MODERATE PROTEIN-CALORIE MALNUTRITION (HCC): Status: ACTIVE | Noted: 2022-02-07

## 2022-02-07 LAB
AFP-TM SERPL-MCNC: 14.1 NG/ML
ANION GAP SERPL CALC-SCNC: 5 MMOL/L (ref 7–16)
BASOPHILS # BLD: 0 K/UL (ref 0–0.2)
BASOPHILS NFR BLD: 0 % (ref 0–2)
BUN SERPL-MCNC: 25 MG/DL (ref 8–23)
CALCIUM SERPL-MCNC: 9.5 MG/DL (ref 8.3–10.4)
CANCER AG125 SERPL-ACNC: 43 U/ML (ref 1.5–35)
CANCER AG15-3 SERPL-ACNC: 10 U/ML (ref 1–35)
CANCER AG19-9 SERPL-ACNC: 278.8 U/ML (ref 2–37)
CHLORIDE SERPL-SCNC: 99 MMOL/L (ref 98–107)
CO2 SERPL-SCNC: 31 MMOL/L (ref 21–32)
CREAT SERPL-MCNC: 0.86 MG/DL (ref 0.6–1)
DIFFERENTIAL METHOD BLD: ABNORMAL
EOSINOPHIL # BLD: 0.1 K/UL (ref 0–0.8)
EOSINOPHIL NFR BLD: 1 % (ref 0.5–7.8)
ERYTHROCYTE [DISTWIDTH] IN BLOOD BY AUTOMATED COUNT: 13.6 % (ref 11.9–14.6)
GLUCOSE BLD STRIP.AUTO-MCNC: 140 MG/DL (ref 65–100)
GLUCOSE BLD STRIP.AUTO-MCNC: 163 MG/DL (ref 65–100)
GLUCOSE BLD STRIP.AUTO-MCNC: 265 MG/DL (ref 65–100)
GLUCOSE BLD STRIP.AUTO-MCNC: 81 MG/DL (ref 65–100)
GLUCOSE SERPL-MCNC: 151 MG/DL (ref 65–100)
HCT VFR BLD AUTO: 45.2 % (ref 35.8–46.3)
HGB BLD-MCNC: 15.4 G/DL (ref 11.7–15.4)
IMM GRANULOCYTES # BLD AUTO: 0.1 K/UL (ref 0–0.5)
IMM GRANULOCYTES NFR BLD AUTO: 1 % (ref 0–5)
INR PPP: 1.1
LYMPHOCYTES # BLD: 2.2 K/UL (ref 0.5–4.6)
LYMPHOCYTES NFR BLD: 17 % (ref 13–44)
MCH RBC QN AUTO: 29.6 PG (ref 26.1–32.9)
MCHC RBC AUTO-ENTMCNC: 34.1 G/DL (ref 31.4–35)
MCV RBC AUTO: 86.9 FL (ref 79.6–97.8)
MONOCYTES # BLD: 0.9 K/UL (ref 0.1–1.3)
MONOCYTES NFR BLD: 7 % (ref 4–12)
NEUTS SEG # BLD: 9.4 K/UL (ref 1.7–8.2)
NEUTS SEG NFR BLD: 74 % (ref 43–78)
NRBC # BLD: 0 K/UL (ref 0–0.2)
PLATELET # BLD AUTO: 270 K/UL (ref 150–450)
PMV BLD AUTO: 9.8 FL (ref 9.4–12.3)
POTASSIUM SERPL-SCNC: 3.6 MMOL/L (ref 3.5–5.1)
PROTHROMBIN TIME: 14 SEC (ref 12.6–14.5)
RBC # BLD AUTO: 5.2 M/UL (ref 4.05–5.2)
SERVICE CMNT-IMP: ABNORMAL
SERVICE CMNT-IMP: NORMAL
SODIUM SERPL-SCNC: 135 MMOL/L (ref 136–145)
WBC # BLD AUTO: 12.6 K/UL (ref 4.3–11.1)

## 2022-02-07 PROCEDURE — 88341 IMHCHEM/IMCYTCHM EA ADD ANTB: CPT

## 2022-02-07 PROCEDURE — 88342 IMHCHEM/IMCYTCHM 1ST ANTB: CPT

## 2022-02-07 PROCEDURE — 2709999900 HC NON-CHARGEABLE SUPPLY

## 2022-02-07 PROCEDURE — 85610 PROTHROMBIN TIME: CPT

## 2022-02-07 PROCEDURE — 88305 TISSUE EXAM BY PATHOLOGIST: CPT

## 2022-02-07 PROCEDURE — 74011250637 HC RX REV CODE- 250/637: Performed by: FAMILY MEDICINE

## 2022-02-07 PROCEDURE — 74011000250 HC RX REV CODE- 250: Performed by: PHYSICIAN ASSISTANT

## 2022-02-07 PROCEDURE — 74011636637 HC RX REV CODE- 636/637: Performed by: FAMILY MEDICINE

## 2022-02-07 PROCEDURE — 36415 COLL VENOUS BLD VENIPUNCTURE: CPT

## 2022-02-07 PROCEDURE — 74011250636 HC RX REV CODE- 250/636: Performed by: FAMILY MEDICINE

## 2022-02-07 PROCEDURE — 82962 GLUCOSE BLOOD TEST: CPT

## 2022-02-07 PROCEDURE — 74011250637 HC RX REV CODE- 250/637: Performed by: NURSE PRACTITIONER

## 2022-02-07 PROCEDURE — 85025 COMPLETE CBC W/AUTO DIFF WBC: CPT

## 2022-02-07 PROCEDURE — 80048 BASIC METABOLIC PNL TOTAL CA: CPT

## 2022-02-07 PROCEDURE — 49180 BIOPSY ABDOMINAL MASS: CPT

## 2022-02-07 PROCEDURE — 74011000250 HC RX REV CODE- 250: Performed by: FAMILY MEDICINE

## 2022-02-07 PROCEDURE — 65270000029 HC RM PRIVATE

## 2022-02-07 PROCEDURE — 74011000258 HC RX REV CODE- 258: Performed by: FAMILY MEDICINE

## 2022-02-07 PROCEDURE — 0WB83ZX EXCISION OF CHEST WALL, PERCUTANEOUS APPROACH, DIAGNOSTIC: ICD-10-PCS | Performed by: RADIOLOGY

## 2022-02-07 RX ORDER — ONDANSETRON 4 MG/1
4 TABLET, ORALLY DISINTEGRATING ORAL
Status: CANCELLED | OUTPATIENT
Start: 2022-02-07

## 2022-02-07 RX ORDER — SODIUM CHLORIDE 0.9 % (FLUSH) 0.9 %
5-40 SYRINGE (ML) INJECTION EVERY 8 HOURS
Status: CANCELLED | OUTPATIENT
Start: 2022-02-07

## 2022-02-07 RX ORDER — INSULIN LISPRO 100 [IU]/ML
4 INJECTION, SOLUTION INTRAVENOUS; SUBCUTANEOUS
Status: DISCONTINUED | OUTPATIENT
Start: 2022-02-07 | End: 2022-02-09 | Stop reason: HOSPADM

## 2022-02-07 RX ORDER — INSULIN LISPRO 100 [IU]/ML
INJECTION, SOLUTION INTRAVENOUS; SUBCUTANEOUS
Status: DISCONTINUED | OUTPATIENT
Start: 2022-02-07 | End: 2022-02-07

## 2022-02-07 RX ORDER — ONDANSETRON 2 MG/ML
4 INJECTION INTRAMUSCULAR; INTRAVENOUS
Status: CANCELLED | OUTPATIENT
Start: 2022-02-07

## 2022-02-07 RX ORDER — GUAIFENESIN 600 MG/1
600 TABLET, EXTENDED RELEASE ORAL 2 TIMES DAILY
Status: CANCELLED | OUTPATIENT
Start: 2022-02-07

## 2022-02-07 RX ORDER — LORAZEPAM 0.5 MG/1
0.25 TABLET ORAL
Status: CANCELLED | OUTPATIENT
Start: 2022-02-07

## 2022-02-07 RX ORDER — LEVOTHYROXINE SODIUM 50 UG/1
50 TABLET ORAL
Status: CANCELLED | OUTPATIENT
Start: 2022-02-08

## 2022-02-07 RX ORDER — FUROSEMIDE 20 MG/1
20 TABLET ORAL DAILY
Status: CANCELLED | OUTPATIENT
Start: 2022-02-08

## 2022-02-07 RX ORDER — VALSARTAN 160 MG/1
160 TABLET ORAL
Status: CANCELLED | OUTPATIENT
Start: 2022-02-07

## 2022-02-07 RX ORDER — DULOXETIN HYDROCHLORIDE 60 MG/1
60 CAPSULE, DELAYED RELEASE ORAL DAILY
Status: CANCELLED | OUTPATIENT
Start: 2022-02-08

## 2022-02-07 RX ORDER — LANOLIN ALCOHOL/MO/W.PET/CERES
1000 CREAM (GRAM) TOPICAL DAILY
Status: CANCELLED | OUTPATIENT
Start: 2022-02-08

## 2022-02-07 RX ORDER — LIDOCAINE HYDROCHLORIDE 20 MG/ML
20-300 INJECTION, SOLUTION INFILTRATION; PERINEURAL
Status: DISCONTINUED | OUTPATIENT
Start: 2022-02-07 | End: 2022-02-11 | Stop reason: HOSPADM

## 2022-02-07 RX ORDER — PANTOPRAZOLE SODIUM 40 MG/1
40 TABLET, DELAYED RELEASE ORAL 2 TIMES DAILY
Status: CANCELLED | OUTPATIENT
Start: 2022-02-07

## 2022-02-07 RX ORDER — ACETAMINOPHEN 650 MG/1
650 SUPPOSITORY RECTAL
Status: CANCELLED | OUTPATIENT
Start: 2022-02-07

## 2022-02-07 RX ORDER — AMOXICILLIN 250 MG
1 CAPSULE ORAL 2 TIMES DAILY
Status: CANCELLED | OUTPATIENT
Start: 2022-02-07

## 2022-02-07 RX ORDER — SODIUM CHLORIDE 0.9 % (FLUSH) 0.9 %
5-40 SYRINGE (ML) INJECTION AS NEEDED
Status: CANCELLED | OUTPATIENT
Start: 2022-02-07

## 2022-02-07 RX ORDER — ROSUVASTATIN CALCIUM 5 MG/1
10 TABLET, COATED ORAL
Status: CANCELLED | OUTPATIENT
Start: 2022-02-07

## 2022-02-07 RX ORDER — ENOXAPARIN SODIUM 100 MG/ML
40 INJECTION SUBCUTANEOUS DAILY
Status: CANCELLED | OUTPATIENT
Start: 2022-02-08

## 2022-02-07 RX ORDER — INSULIN LISPRO 100 [IU]/ML
INJECTION, SOLUTION INTRAVENOUS; SUBCUTANEOUS
Status: CANCELLED | OUTPATIENT
Start: 2022-02-07

## 2022-02-07 RX ORDER — MORPHINE SULFATE 2 MG/ML
2 INJECTION, SOLUTION INTRAMUSCULAR; INTRAVENOUS
Status: CANCELLED | OUTPATIENT
Start: 2022-02-07

## 2022-02-07 RX ORDER — ASPIRIN 81 MG/1
81 TABLET ORAL DAILY
Status: CANCELLED | OUTPATIENT
Start: 2022-02-08

## 2022-02-07 RX ORDER — HYDROCODONE BITARTRATE AND ACETAMINOPHEN 7.5; 325 MG/1; MG/1
1 TABLET ORAL
Status: CANCELLED | OUTPATIENT
Start: 2022-02-07

## 2022-02-07 RX ORDER — INSULIN GLARGINE 100 [IU]/ML
12 INJECTION, SOLUTION SUBCUTANEOUS
Status: CANCELLED | OUTPATIENT
Start: 2022-02-07

## 2022-02-07 RX ORDER — FACIAL-BODY WIPES
10 EACH TOPICAL DAILY PRN
Status: DISCONTINUED | OUTPATIENT
Start: 2022-02-07 | End: 2022-02-09 | Stop reason: HOSPADM

## 2022-02-07 RX ORDER — FLUTICASONE PROPIONATE 110 UG/1
1 AEROSOL, METERED RESPIRATORY (INHALATION)
Status: CANCELLED | OUTPATIENT
Start: 2022-02-07

## 2022-02-07 RX ORDER — PREDNISONE 20 MG/1
20 TABLET ORAL
Status: CANCELLED | OUTPATIENT
Start: 2022-02-08

## 2022-02-07 RX ORDER — ACETAMINOPHEN 325 MG/1
650 TABLET ORAL
Status: CANCELLED | OUTPATIENT
Start: 2022-02-07

## 2022-02-07 RX ORDER — AZITHROMYCIN 250 MG/1
500 TABLET, FILM COATED ORAL DAILY
Status: CANCELLED | OUTPATIENT
Start: 2022-02-08 | End: 2022-02-10

## 2022-02-07 RX ORDER — POLYETHYLENE GLYCOL 3350 17 G/17G
17 POWDER, FOR SOLUTION ORAL DAILY PRN
Status: CANCELLED | OUTPATIENT
Start: 2022-02-07

## 2022-02-07 RX ADMIN — LIDOCAINE HYDROCHLORIDE 40 MG: 20 INJECTION, SOLUTION INFILTRATION; PERINEURAL at 11:33

## 2022-02-07 RX ADMIN — VALSARTAN 160 MG: 160 TABLET, FILM COATED ORAL at 20:56

## 2022-02-07 RX ADMIN — SODIUM CHLORIDE, PRESERVATIVE FREE 10 ML: 5 INJECTION INTRAVENOUS at 06:17

## 2022-02-07 RX ADMIN — Medication 12 UNITS: at 21:00

## 2022-02-07 RX ADMIN — SODIUM CHLORIDE, PRESERVATIVE FREE 10 ML: 5 INJECTION INTRAVENOUS at 14:00

## 2022-02-07 RX ADMIN — CEFTRIAXONE SODIUM 2 G: 2 INJECTION, POWDER, FOR SOLUTION INTRAMUSCULAR; INTRAVENOUS at 16:25

## 2022-02-07 RX ADMIN — GUAIFENESIN 600 MG: 600 TABLET, EXTENDED RELEASE ORAL at 08:49

## 2022-02-07 RX ADMIN — AZITHROMYCIN 500 MG: 250 TABLET, FILM COATED ORAL at 08:49

## 2022-02-07 RX ADMIN — FUROSEMIDE 20 MG: 20 TABLET ORAL at 08:48

## 2022-02-07 RX ADMIN — DOCUSATE SODIUM 50 MG AND SENNOSIDES 8.6 MG 1 TABLET: 8.6; 5 TABLET, FILM COATED ORAL at 16:27

## 2022-02-07 RX ADMIN — PANTOPRAZOLE SODIUM 40 MG: 40 TABLET, DELAYED RELEASE ORAL at 08:49

## 2022-02-07 RX ADMIN — Medication 1000 MCG: at 08:49

## 2022-02-07 RX ADMIN — Medication 4 UNITS: at 13:00

## 2022-02-07 RX ADMIN — DULOXETINE HYDROCHLORIDE 60 MG: 60 CAPSULE, DELAYED RELEASE ORAL at 08:47

## 2022-02-07 RX ADMIN — MORPHINE SULFATE 2 MG: 2 INJECTION, SOLUTION INTRAMUSCULAR; INTRAVENOUS at 18:47

## 2022-02-07 RX ADMIN — DOCUSATE SODIUM 50 MG AND SENNOSIDES 8.6 MG 1 TABLET: 8.6; 5 TABLET, FILM COATED ORAL at 08:49

## 2022-02-07 RX ADMIN — SODIUM CHLORIDE, PRESERVATIVE FREE 10 ML: 5 INJECTION INTRAVENOUS at 21:05

## 2022-02-07 RX ADMIN — INSULIN LISPRO 6 UNITS: 100 INJECTION, SOLUTION INTRAVENOUS; SUBCUTANEOUS at 16:25

## 2022-02-07 RX ADMIN — POLYETHYLENE GLYCOL 3350 17 G: 17 POWDER, FOR SOLUTION ORAL at 14:00

## 2022-02-07 RX ADMIN — Medication 4 UNITS: at 16:26

## 2022-02-07 RX ADMIN — PANTOPRAZOLE SODIUM 40 MG: 40 TABLET, DELAYED RELEASE ORAL at 16:26

## 2022-02-07 RX ADMIN — GUAIFENESIN 600 MG: 600 TABLET, EXTENDED RELEASE ORAL at 20:56

## 2022-02-07 RX ADMIN — LEVOTHYROXINE SODIUM 50 MCG: 0.05 TABLET ORAL at 08:49

## 2022-02-07 RX ADMIN — ROSUVASTATIN CALCIUM 10 MG: 5 TABLET, FILM COATED ORAL at 20:56

## 2022-02-07 RX ADMIN — HYDROCODONE BITARTRATE AND ACETAMINOPHEN 1 TABLET: 7.5; 325 TABLET ORAL at 14:47

## 2022-02-07 NOTE — PROGRESS NOTES
Called IR to verify giving morning meds. Holding any blood thinners , ok to give the rest with a sip of water.

## 2022-02-07 NOTE — PROGRESS NOTES
Hospitalist Progress Note   Admit Date:  2022  2:25 PM   Name:  Brie Simon   Age:  81 y.o. Sex:  female  :  1935   MRN:  [id number]   Room:  [facility]    Presenting Complaint: Fatigue    Reason(s) for Admission: CAP (community acquired pneumonia) [J18.9]     Hospital Course & Interval History: Brie Simon is a 81 y.o. female with medical history of sarcoidosis, vocal cord paralysis, hypothyroidism, hypertension, type 2 diabetes mellitus presented to ED with worsening generalized weakness, unintentional weight loss, poor oral intake, and abdominal pain. Symptoms started apparently in October/2022. Initial ED work-up suggestive of right lower lobe pneumonia and patient started on empiric antibiotic for CAP coverage. Given GI symptoms, unintentional weight loss ultrasound abdomen performed which was suggestive of metastatic liver disease. CT chest/abdomen/pelvis with contrast ordered and showed extensive pulmonary metastasis, metastatic liver disease with potential splenic metastasis. Tumor markers ordered. Oncology consulted and recommend US guided liver biopsy. IR consulted. Subjective (22): Patient is seen at the bedside. Complaining of back pain and abdominal pain, but reports pain meds helping. No other new complaint. Patient is n.p.o. for ultrasound-guided needle biopsy by IR today. Assessment & Plan:     Metastatic disease, new diagnosis:  Patient with symptoms of generalized weakness, unintentional weight loss, poor oral intake and abdominal pain for 2 to 3 months  Ultrasound abdomen suggestive of metastatic liver disease.     CT chest/abdomen/pelvis with contrast ordered and showed extensive pulmonary metastasis, metastatic liver disease with potential splenic metastasiscancer  Tumor markers ordered  Oncology consulted and recommend ultrasound-guided liver biopsy  IR consulted and patient is scheduled for ultrasound-guided needle biopsy today.    Community-acquired pneumonia:  Rapid Covid negative  Chest x-ray showed possible RLL  Pneumonia  Sputum culture ordered   blood cultures ordered  on ceftriaxone/azithromycin for CAP coverage  Supplemental oxygen as needed  Chest x-ray finding could be reflection of metastatic disease. Will complete antibiotic course    Diabetes mellitus:   Held oral hypoglycemic  Sliding scale  Continue Lantus 12 units at bedtime and add lispro 5 units 3 times daily AC    Elevated troponin:  Likely demand ischemia  EKG reviewed  Patient currently asymptomatic     Debility:  PT/OT     Hypertension/hyperlipidemia:  Continue valsartan, statin and furosemide     Hypothyroidism:  Continue levothyroxine     Sarcoidosis: At home on 20 mg prednisone daily, will continue      Depression:  Continue duloxetine        Dispo/Discharge Planning:  To be determined    Diet:  ADULT ORAL NUTRITION SUPPLEMENT Lunch, Dinner, Breakfast; Standard High Calorie/High Protein  DIET NPO  DVT PPx: Lovenox  Code status: Full Code    Hospital Problems as of 2/7/2022 Date Reviewed: 12/30/2021          Codes Class Noted - Resolved POA    Moderate protein-calorie malnutrition ([facility] ICD-10-CM: E44.0  ICD-9-CM: 263.0  2/7/2022 - Present Yes        * (Principal) CAP (community acquired pneumonia) ICD-10-CM: J18.9  ICD-9-CM: 604  2/4/2022 - Present Unknown        Dyslipidemia ICD-10-CM: E78.5  ICD-9-CM: 272.4  10/8/2020 - Present Yes        Acquired hypothyroidism ICD-10-CM: E03.9  ICD-9-CM: 244.9  12/12/2019 - Present Yes        Type 2 diabetes mellitus, without long-term current use of insulin ([facility] ICD-10-CM: E11.9  ICD-9-CM: 250.00  12/12/2019 - Present Yes        Vocal cord paralysis ICD-10-CM: J38.00  ICD-9-CM: 478.30  12/12/2019 - Present Yes              Objective:     Patient Vitals for the past 24 hrs:   Temp Pulse Resp BP SpO2   02/07/22 0848 -- 97 -- 113/68 --   02/07/22 0812 97.6 °F (36.4 °C) (!) 112 20 113/68 96 %   02/07/22 0330 98.1 °F (36.7 °C) (!) 114 16 (!) 140/55 95 %   02/06/22 2356 97.8 °F (36.6 °C) (!) 111 15 132/82 95 %   02/06/22 2012 97.9 °F (36.6 °C) (!) 120 16 122/85 95 %   02/06/22 1604 97.5 °F (36.4 °C) (!) 109 20 (!) 148/86 95 %     Oxygen Therapy  O2 Sat (%): 96 % (02/07/22 0812)  Pulse via Oximetry: 108 beats per minute (02/05/22 0839)  O2 Device: None (Room air) (02/07/22 0830)  O2 Flow Rate (L/min): 0 l/min (02/05/22 0839)  FIO2 (%): 21 % (02/05/22 0839)    Estimated body mass index is 23.24 kg/m² as calculated from the following:    Height as of this encounter: 5' 6\" (1.676 m). Weight as of this encounter: 65.3 kg (144 lb). Intake/Output Summary (Last 24 hours) at 2/7/2022 1244  Last data filed at 2/7/2022 7741  Gross per 24 hour   Intake 660 ml   Output 950 ml   Net -290 ml         Physical Exam:     Blood pressure 113/68, pulse 97, temperature 97.6 °F (36.4 °C), resp. rate 20, height 5' 6\" (1.676 m), weight 65.3 kg (144 lb), SpO2 96 %. General:    No overt distress  Head:  Normocephalic, atraumatic  Eyes:  Sclerae appear normal.  Pupils equally round. ENT:  Nares appear normal, no drainage. Moist oral mucosa  Neck:  No restricted ROM. Trachea midline   CV:   RRR. No m/r/g. No jugular venous distension. Lungs:   Left lower lung crackles  Abdomen: Bowel sounds present. Soft, nontender, nondistended. Extremities: No cyanosis or clubbing. No edema  Skin:     No rashes and normal coloration. Warm and dry. Neuro:  CN II-XII grossly intact. Sensation intact. A&Ox3  Psych:  Normal mood and affect.       I have reviewed ordered lab tests and independently visualized imaging below:    Recent Labs:  Recent Results (from the past 48 hour(s))   CANCER ANTIGEN 125    Collection Time: 02/05/22  2:09 PM   Result Value Ref Range    CA-125 43 (H) 1.5 - 35.0 U/mL   CANCER AG 19-9    Collection Time: 02/05/22  2:09 PM   Result Value Ref Range    Cancer antigen 19-9 278.80 (H) 2.0 - 37.0 U/mL   CEA    Collection Time: 02/05/22 2:09 PM   Result Value Ref Range    CEA 4.6 (H) 0.0 - 3.0 ng/mL   AFP, TUMOR MARKER    Collection Time: 02/05/22  2:09 PM   Result Value Ref Range    AFP, Tumor marker 14.10 (H) <8.0 ng/mL   GLUCOSE, POC    Collection Time: 02/05/22  4:27 PM   Result Value Ref Range    Glucose (POC) 259 (H) 65 - 100 mg/dL    Performed by Marisela    GLUCOSE, POC    Collection Time: 02/05/22  9:10 PM   Result Value Ref Range    Glucose (POC) 357 (H) 65 - 100 mg/dL    Performed by [email or web address]    METABOLIC PANEL, BASIC    Collection Time: 02/06/22  5:54 AM   Result Value Ref Range    Sodium 135 (L) 136 - 145 mmol/L    Potassium 3.6 3.5 - 5.1 mmol/L    Chloride 99 98 - 107 mmol/L    CO2 30 21 - 32 mmol/L    Anion gap 6 (L) 7 - 16 mmol/L    Glucose 161 (H) 65 - 100 mg/dL    BUN 20 8 - 23 MG/DL    Creatinine 0.76 0.6 - 1.0 MG/DL    GFR est AA >60 >60 ml/min/1.73m2    GFR est non-AA >60 >60 ml/min/1.73m2    Calcium 9.0 8.3 - 10.4 MG/DL   CBC WITH AUTOMATED DIFF    Collection Time: 02/06/22  5:54 AM   Result Value Ref Range    WBC 12.9 (H) 4.3 - 11.1 K/uL    RBC 5.16 4.05 - 5.2 M/uL    HGB 15.1 11.7 - 15.4 g/dL    HCT 45.3 35.8 - 46.3 %    MCV 87.8 79.6 - 97.8 FL    MCH 29.3 26.1 - 32.9 PG    MCHC 33.3 31.4 - 35.0 g/dL    RDW 13.8 11.9 - 14.6 %    PLATELET 702 652 - 198 K/uL    MPV 9.6 9.4 - 12.3 FL    ABSOLUTE NRBC 0.00 0.0 - 0.2 K/uL    DF AUTOMATED      NEUTROPHILS 75 43 - 78 %    LYMPHOCYTES 17 13 - 44 %    MONOCYTES 7 4.0 - 12.0 %    EOSINOPHILS 0 (L) 0.5 - 7.8 %    BASOPHILS 0 0.0 - 2.0 %    IMMATURE GRANULOCYTES 1 0.0 - 5.0 %    ABS. NEUTROPHILS 9.6 (H) 1.7 - 8.2 K/UL    ABS. LYMPHOCYTES 2.1 0.5 - 4.6 K/UL    ABS. MONOCYTES 0.9 0.1 - 1.3 K/UL    ABS. EOSINOPHILS 0.1 0.0 - 0.8 K/UL    ABS. BASOPHILS 0.0 0.0 - 0.2 K/UL    ABS. IMM.  GRANS. 0.1 0.0 - 0.5 K/UL   CANCER ANTIGEN (CA) 15-3    Collection Time: 02/06/22  5:54 AM   Result Value Ref Range    Cancer antigen 15-3 10.00 1.0 - 35.0 U/mL   GLUCOSE, POC    Collection Time: 02/06/22  8:16 AM   Result Value Ref Range    Glucose (POC) 166 (H) 65 - 100 mg/dL    Performed by Gabbie    GLUCOSE, POC    Collection Time: 02/06/22 11:49 AM   Result Value Ref Range    Glucose (POC) 256 (H) 65 - 100 mg/dL    Performed by Arelis Anderson    GLUCOSE, POC    Collection Time: 02/06/22  4:08 PM   Result Value Ref Range    Glucose (POC) 251 (H) 65 - 100 mg/dL    Performed by Gabbie    GLUCOSE, POC    Collection Time: 02/06/22  8:44 PM   Result Value Ref Range    Glucose (POC) 313 (H) 65 - 100 mg/dL    Performed by Lawrence    CBC WITH AUTOMATED DIFF    Collection Time: 02/07/22  5:50 AM   Result Value Ref Range    WBC 12.6 (H) 4.3 - 11.1 K/uL    RBC 5.20 4.05 - 5.2 M/uL    HGB 15.4 11.7 - 15.4 g/dL    HCT 45.2 35.8 - 46.3 %    MCV 86.9 79.6 - 97.8 FL    MCH 29.6 26.1 - 32.9 PG    MCHC 34.1 31.4 - 35.0 g/dL    RDW 13.6 11.9 - 14.6 %    PLATELET 269 265 - 831 K/uL    MPV 9.8 9.4 - 12.3 FL    ABSOLUTE NRBC 0.00 0.0 - 0.2 K/uL    DF AUTOMATED      NEUTROPHILS 74 43 - 78 %    LYMPHOCYTES 17 13 - 44 %    MONOCYTES 7 4.0 - 12.0 %    EOSINOPHILS 1 0.5 - 7.8 %    BASOPHILS 0 0.0 - 2.0 %    IMMATURE GRANULOCYTES 1 0.0 - 5.0 %    ABS. NEUTROPHILS 9.4 (H) 1.7 - 8.2 K/UL    ABS. LYMPHOCYTES 2.2 0.5 - 4.6 K/UL    ABS. MONOCYTES 0.9 0.1 - 1.3 K/UL    ABS. EOSINOPHILS 0.1 0.0 - 0.8 K/UL    ABS. BASOPHILS 0.0 0.0 - 0.2 K/UL    ABS. IMM.  GRANS. 0.1 0.0 - 0.5 K/UL   METABOLIC PANEL, BASIC    Collection Time: 02/07/22  5:50 AM   Result Value Ref Range    Sodium 135 (L) 136 - 145 mmol/L    Potassium 3.6 3.5 - 5.1 mmol/L    Chloride 99 98 - 107 mmol/L    CO2 31 21 - 32 mmol/L    Anion gap 5 (L) 7 - 16 mmol/L    Glucose 151 (H) 65 - 100 mg/dL    BUN 25 (H) 8 - 23 MG/DL    Creatinine 0.86 0.6 - 1.0 MG/DL    GFR est AA >60 >60 ml/min/1.73m2    GFR est non-AA >60 >60 ml/min/1.73m2    Calcium 9.5 8.3 - 10.4 MG/DL   PROTHROMBIN TIME + INR    Collection Time: 02/07/22  5:50 AM   Result Value Ref Range    Prothrombin time 14.0 12.6 - 14.5 sec    INR 1.1     GLUCOSE, POC    Collection Time: 02/07/22  7:48 AM   Result Value Ref Range    Glucose (POC) 140 (H) 65 - 100 mg/dL    Performed by [facility]        All Micro Results     Procedure Component Value Units Date/Time    CULTURE, BLOOD [[id number]] Collected: 02/04/22 1633    Order Status: Completed Specimen: Blood Updated: 02/07/22 0708     Special Requests: --        RIGHT  Antecubital       Culture result: NO GROWTH 3 DAYS       CULTURE, BLOOD [[id number]] Collected: 02/04/22 1635    Order Status: Completed Specimen: Blood Updated: 02/07/22 0708     Special Requests: --        RIGHT  FOREARM       Culture result: NO GROWTH 3 DAYS       CULTURE, RESPIRATORY/SPUTUM/BRONCH Erby Castillo STAIN [[id number]] Collected: 02/04/22 1730    Order Status: Canceled Specimen: Sputum     COVID-19 RAPID TEST [[id number]] Collected: 02/04/22 1515    Order Status: Completed Specimen: Nasopharyngeal Updated: 02/04/22 1544     Specimen source NASAL SWAB        COVID-19 rapid test Not detected        Comment:      The specimen is NEGATIVE for SARS-CoV-2, the novel coronavirus associated with COVID-19. A negative result does not rule out COVID-19. This test has been authorized by the FDA under an Emergency Use Authorization (EUA) for use by authorized laboratories. Fact sheet for Healthcare Providers: ConventionUpdate.co.nz  Fact sheet for Patients: ConventionUpdate.co.nz       Methodology: Isothermal Nucleic Acid Amplification               Other Studies:  No results found.     Current Meds:  Current Facility-Administered Medications   Medication Dose Route Frequency    senna-docusate (PERICOLACE) 8.6-50 mg per tablet 1 Tablet  1 Tablet Oral BID    HYDROcodone-acetaminophen (NORCO) 7.5-325 mg per tablet 1 Tablet  1 Tablet Oral Q6H PRN    morphine injection 2 mg  2 mg IntraVENous Q6H PRN    insulin glargine (LANTUS) injection 12 Units  12 Units SubCUTAneous QHS    lip protectant (BLISTEX) ointment 1 Each  1 Each Topical PRN    aspirin delayed-release tablet 81 mg  81 mg Oral DAILY    cyanocobalamin tablet 1,000 mcg  1,000 mcg Oral DAILY    DULoxetine (CYMBALTA) capsule 60 mg  60 mg Oral DAILY    fluticasone (FLOVENT HFA) 110 mcg inhaler  1 Puff Inhalation BID RT    furosemide (LASIX) tablet 20 mg  20 mg Oral DAILY    levothyroxine (SYNTHROID) tablet 50 mcg  50 mcg Oral ACB    pantoprazole (PROTONIX) tablet 40 mg  40 mg Oral BID    predniSONE (DELTASONE) tablet 20 mg  20 mg Oral DAILY WITH BREAKFAST    rosuvastatin (CRESTOR) tablet 10 mg  10 mg Oral QHS    valsartan (DIOVAN) tablet 160 mg  160 mg Oral QHS    sodium chloride (NS) flush 5-40 mL  5-40 mL IntraVENous Q8H    sodium chloride (NS) flush 5-40 mL  5-40 mL IntraVENous PRN    acetaminophen (TYLENOL) tablet 650 mg  650 mg Oral Q6H PRN    Or    acetaminophen (TYLENOL) suppository 650 mg  650 mg Rectal Q6H PRN    polyethylene glycol (MIRALAX) packet 17 g  17 g Oral DAILY PRN    ondansetron (ZOFRAN ODT) tablet 4 mg  4 mg Oral Q8H PRN    Or    ondansetron (ZOFRAN) injection 4 mg  4 mg IntraVENous Q6H PRN    enoxaparin (LOVENOX) injection 40 mg  40 mg SubCUTAneous DAILY    cefTRIAXone (ROCEPHIN) 2 g in 0.9% sodium chloride (MBP/ADV) 50 mL MBP  2 g IntraVENous Q24H    azithromycin (ZITHROMAX) tablet 500 mg  500 mg Oral DAILY    insulin lispro (HUMALOG) injection   SubCUTAneous AC&HS    guaiFENesin ER (MUCINEX) tablet 600 mg  600 mg Oral BID    LORazepam (ATIVAN) tablet 0.25 mg  0.25 mg Oral Q12H PRN     Facility-Administered Medications Ordered in Other Encounters   Medication Dose Route Frequency    lidocaine (XYLOCAINE) 20 mg/mL (2 %) injection  mg   mg IntraDERMal Rad Multiple       Signed:  Ellis Wright MD    Part of this note may have been written by using a voice dictation software.   The note has been proof read but may still contain some grammatical/other typographical errors.

## 2022-02-07 NOTE — PROGRESS NOTES
Pt to Ultrasound via stretcher with RN.       IR Nurse Pre-Procedure Checklist Part 2          Consent signed: Yes    H&P complete:  Not applicable    Antibiotics: Not applicable    Airway/Mallampati Done: Not applicable    Shaved: Not applicable    Pregnancy Form:Not applicable    Patient Position: Yes    MD Side: Yes     Biopsy Worksheet: Yes    Specimen Medium: Yes

## 2022-02-07 NOTE — PROGRESS NOTES
Pt just got back from IR eating her lunch. Therapist ask can I come back and work with you. Stated I am tired and nausea. I will do it another day. Will check on pt tomorrow.

## 2022-02-07 NOTE — PROGRESS NOTES
END OF SHIFT NOTE:    VITAL SIGNS  Patient Vitals for the past 12 hrs:   Temp Pulse Resp BP SpO2   02/07/22 0330 98.1 °F (36.7 °C) (!) 114 16 (!) 140/55 95 %   02/06/22 2356 97.8 °F (36.6 °C) (!) 111 15 132/82 95 %   02/06/22 2012 97.9 °F (36.6 °C) (!) 120 16 122/85 95 %   02/06/22 1604 97.5 °F (36.4 °C) (!) 109 20 (!) 148/86 95 %     -pt was NPO at [place] for bx today  -pt did not have a BM today  -pt unable to cough up sputum for sample due to dry cough  -pt resting in bed, vss

## 2022-02-07 NOTE — PROGRESS NOTES
TRANSFER - OUT REPORT:    Verbal report given to [employer] Leon on Carmen Whitten  being transferred to [facility] for routine progression of care       Report consisted of patients Situation, Background, Assessment and   Recommendations(SBAR). Information from the following report(s) SBAR, Procedure Summary and MAR was reviewed with the receiving nurse. Opportunity for questions and clarification was provided. Conscious Sedation:   0 Mcg of Fentanyl administered  0 Mg of Versed administered    Pt tolerated procedure well.      Visit Vitals  /75 (BP 1 Location: Left upper arm, BP Patient Position: At rest)   Pulse (!) 114   Temp 98.7 °F (37.1 °C)   Resp 16   SpO2 94%     Past Medical History:   Diagnosis Date    Diabetes mellitus, type 2 ([facility]     Hx of sarcoidosis     Left wrist dislocation 07/2021    no surgery, just re-set     Peripheral IV Right Antecubital (Active)   Site Assessment Clean, dry, & intact 02/07/22 0830   Phlebitis Assessment 0 02/07/22 0830   Infiltration Assessment 0 02/07/22 0830   Dressing Status Clean, dry, & intact 02/07/22 0830   Dressing Type Tape;Transparent 02/07/22 0830   Hub Color/Line Status Flushed 02/07/22 0830   Action Taken Other (comment) 02/06/22 0745   Alcohol Cap Used No 02/07/22 0830       Peripheral IV 02/04/22 Right Antecubital (Active)   Site Assessment Clean, dry, & intact 02/07/22 0830   Phlebitis Assessment 0 02/07/22 0830   Infiltration Assessment 0 02/07/22 0830   Dressing Status Clean, dry, & intact 02/07/22 0830   Dressing Type Tape;Transparent 02/07/22 0830   Hub Color/Line Status Flushed;Patent 02/07/22 0830   Action Taken Other (comment) 02/06/22 0745   Alcohol Cap Used No 02/07/22 0830

## 2022-02-08 LAB
ANION GAP SERPL CALC-SCNC: 6 MMOL/L (ref 7–16)
BASOPHILS # BLD: 0 K/UL (ref 0–0.2)
BASOPHILS NFR BLD: 0 % (ref 0–2)
BUN SERPL-MCNC: 22 MG/DL (ref 8–23)
CALCIUM SERPL-MCNC: 9.2 MG/DL (ref 8.3–10.4)
CHLORIDE SERPL-SCNC: 99 MMOL/L (ref 98–107)
CO2 SERPL-SCNC: 31 MMOL/L (ref 21–32)
CREAT SERPL-MCNC: 0.76 MG/DL (ref 0.6–1)
DIFFERENTIAL METHOD BLD: ABNORMAL
EOSINOPHIL # BLD: 0.1 K/UL (ref 0–0.8)
EOSINOPHIL NFR BLD: 0 % (ref 0.5–7.8)
ERYTHROCYTE [DISTWIDTH] IN BLOOD BY AUTOMATED COUNT: 14.1 % (ref 11.9–14.6)
GLUCOSE BLD STRIP.AUTO-MCNC: 105 MG/DL (ref 65–100)
GLUCOSE BLD STRIP.AUTO-MCNC: 182 MG/DL (ref 65–100)
GLUCOSE BLD STRIP.AUTO-MCNC: 189 MG/DL (ref 65–100)
GLUCOSE BLD STRIP.AUTO-MCNC: 78 MG/DL (ref 65–100)
GLUCOSE SERPL-MCNC: 86 MG/DL (ref 65–100)
HCT VFR BLD AUTO: 44 % (ref 35.8–46.3)
HGB BLD-MCNC: 14.6 G/DL (ref 11.7–15.4)
IMM GRANULOCYTES # BLD AUTO: 0.1 K/UL (ref 0–0.5)
IMM GRANULOCYTES NFR BLD AUTO: 1 % (ref 0–5)
LYMPHOCYTES # BLD: 1.7 K/UL (ref 0.5–4.6)
LYMPHOCYTES NFR BLD: 15 % (ref 13–44)
MCH RBC QN AUTO: 29.3 PG (ref 26.1–32.9)
MCHC RBC AUTO-ENTMCNC: 33.2 G/DL (ref 31.4–35)
MCV RBC AUTO: 88.2 FL (ref 79.6–97.8)
MONOCYTES # BLD: 0.8 K/UL (ref 0.1–1.3)
MONOCYTES NFR BLD: 7 % (ref 4–12)
NEUTS SEG # BLD: 9 K/UL (ref 1.7–8.2)
NEUTS SEG NFR BLD: 77 % (ref 43–78)
NRBC # BLD: 0 K/UL (ref 0–0.2)
PLATELET # BLD AUTO: 245 K/UL (ref 150–450)
PMV BLD AUTO: 9.7 FL (ref 9.4–12.3)
POTASSIUM SERPL-SCNC: 3.6 MMOL/L (ref 3.5–5.1)
RBC # BLD AUTO: 4.99 M/UL (ref 4.05–5.2)
SERVICE CMNT-IMP: ABNORMAL
SERVICE CMNT-IMP: NORMAL
SODIUM SERPL-SCNC: 136 MMOL/L (ref 136–145)
WBC # BLD AUTO: 11.8 K/UL (ref 4.3–11.1)

## 2022-02-08 PROCEDURE — 74011250636 HC RX REV CODE- 250/636: Performed by: FAMILY MEDICINE

## 2022-02-08 PROCEDURE — 80048 BASIC METABOLIC PNL TOTAL CA: CPT

## 2022-02-08 PROCEDURE — 74011636637 HC RX REV CODE- 636/637: Performed by: FAMILY MEDICINE

## 2022-02-08 PROCEDURE — APPNB30 APP NON BILLABLE TIME 0-30 MINS: Performed by: NURSE PRACTITIONER

## 2022-02-08 PROCEDURE — 74011250637 HC RX REV CODE- 250/637: Performed by: FAMILY MEDICINE

## 2022-02-08 PROCEDURE — 65270000029 HC RM PRIVATE

## 2022-02-08 PROCEDURE — 85025 COMPLETE CBC W/AUTO DIFF WBC: CPT

## 2022-02-08 PROCEDURE — 74011000258 HC RX REV CODE- 258: Performed by: FAMILY MEDICINE

## 2022-02-08 PROCEDURE — 74011250637 HC RX REV CODE- 250/637: Performed by: NURSE PRACTITIONER

## 2022-02-08 PROCEDURE — 36415 COLL VENOUS BLD VENIPUNCTURE: CPT

## 2022-02-08 PROCEDURE — 74011000250 HC RX REV CODE- 250: Performed by: FAMILY MEDICINE

## 2022-02-08 PROCEDURE — 51798 US URINE CAPACITY MEASURE: CPT

## 2022-02-08 PROCEDURE — 97530 THERAPEUTIC ACTIVITIES: CPT

## 2022-02-08 PROCEDURE — 82962 GLUCOSE BLOOD TEST: CPT

## 2022-02-08 RX ORDER — POLYETHYLENE GLYCOL 3350 17 G/17G
17 POWDER, FOR SOLUTION ORAL 2 TIMES DAILY
Status: DISCONTINUED | OUTPATIENT
Start: 2022-02-08 | End: 2022-02-09 | Stop reason: HOSPADM

## 2022-02-08 RX ADMIN — Medication 12 UNITS: at 21:55

## 2022-02-08 RX ADMIN — LEVOTHYROXINE SODIUM 50 MCG: 0.05 TABLET ORAL at 08:42

## 2022-02-08 RX ADMIN — Medication 1000 MCG: at 08:42

## 2022-02-08 RX ADMIN — HYDROCODONE BITARTRATE AND ACETAMINOPHEN 1 TABLET: 7.5; 325 TABLET ORAL at 14:59

## 2022-02-08 RX ADMIN — INSULIN LISPRO 2 UNITS: 100 INJECTION, SOLUTION INTRAVENOUS; SUBCUTANEOUS at 16:41

## 2022-02-08 RX ADMIN — AZITHROMYCIN 500 MG: 250 TABLET, FILM COATED ORAL at 08:42

## 2022-02-08 RX ADMIN — ROSUVASTATIN CALCIUM 10 MG: 5 TABLET, FILM COATED ORAL at 21:57

## 2022-02-08 RX ADMIN — DOCUSATE SODIUM 50 MG AND SENNOSIDES 8.6 MG 1 TABLET: 8.6; 5 TABLET, FILM COATED ORAL at 08:42

## 2022-02-08 RX ADMIN — INSULIN LISPRO 2 UNITS: 100 INJECTION, SOLUTION INTRAVENOUS; SUBCUTANEOUS at 21:55

## 2022-02-08 RX ADMIN — LACTULOSE 30 ML: 20 SOLUTION ORAL at 13:43

## 2022-02-08 RX ADMIN — VALSARTAN 160 MG: 160 TABLET, FILM COATED ORAL at 21:57

## 2022-02-08 RX ADMIN — POLYETHYLENE GLYCOL 3350 17 G: 17 POWDER, FOR SOLUTION ORAL at 17:27

## 2022-02-08 RX ADMIN — SODIUM CHLORIDE, PRESERVATIVE FREE 10 ML: 5 INJECTION INTRAVENOUS at 13:44

## 2022-02-08 RX ADMIN — PANTOPRAZOLE SODIUM 40 MG: 40 TABLET, DELAYED RELEASE ORAL at 08:42

## 2022-02-08 RX ADMIN — DOCUSATE SODIUM 50 MG AND SENNOSIDES 8.6 MG 1 TABLET: 8.6; 5 TABLET, FILM COATED ORAL at 17:27

## 2022-02-08 RX ADMIN — SODIUM CHLORIDE, PRESERVATIVE FREE 10 ML: 5 INJECTION INTRAVENOUS at 05:10

## 2022-02-08 RX ADMIN — DULOXETINE HYDROCHLORIDE 60 MG: 60 CAPSULE, DELAYED RELEASE ORAL at 08:42

## 2022-02-08 RX ADMIN — CEFTRIAXONE SODIUM 2 G: 2 INJECTION, POWDER, FOR SOLUTION INTRAMUSCULAR; INTRAVENOUS at 16:41

## 2022-02-08 RX ADMIN — ENOXAPARIN SODIUM 40 MG: 100 INJECTION SUBCUTANEOUS at 08:42

## 2022-02-08 RX ADMIN — PREDNISONE 20 MG: 20 TABLET ORAL at 08:42

## 2022-02-08 RX ADMIN — Medication 81 MG: at 08:42

## 2022-02-08 RX ADMIN — FUROSEMIDE 20 MG: 20 TABLET ORAL at 09:00

## 2022-02-08 RX ADMIN — PANTOPRAZOLE SODIUM 40 MG: 40 TABLET, DELAYED RELEASE ORAL at 17:26

## 2022-02-08 RX ADMIN — GUAIFENESIN 600 MG: 600 TABLET, EXTENDED RELEASE ORAL at 21:57

## 2022-02-08 RX ADMIN — GUAIFENESIN 600 MG: 600 TABLET, EXTENDED RELEASE ORAL at 08:42

## 2022-02-08 RX ADMIN — SODIUM CHLORIDE, PRESERVATIVE FREE 10 ML: 5 INJECTION INTRAVENOUS at 21:57

## 2022-02-08 NOTE — ADT AUTH CERT NOTES
[facility]     FACILITY NPI :  FACILITY TAX ID :      [facility]  SFE 3M  [address] [address] [address] [phone]            Patient Name :Saul Fermin   : 1935 (81 yrs)  MRN : [id number]     Patient Mailing Address [address]  [place]                                          [address] [[address]] , [address]                                                           .         Insurance Plan Payor: BLUE CROSS MEDICARE / [place] / Product Type: Managed Care Medicare /      Primary Coverage Subscriber ID :      Secondary Coverage:  N/A     Secondary Subscriber ID :        Current Patient Class : INPATIENT  Admit Date : 2022     REQUESTED LEVEL OF CARE: INPATIENT [101]                                                           Diagnosis : CAP (community acquired pneumonia)                          ICD10 Code : CAP (community acquired pneumonia) [J18.9]     Current Room and Bed 348/01     Admitting and Attending Info:  Admitting Provider : Otto Neves MD   NPI: [id number]  Admitting Provider Phone. [phone]  Admitting Provider Address: [address]     Attending Provider Millicent Riojas MD   [id number]  Attending Provider Address:  [address] [place] [address]                                                   [address] [facility]     Attending Provider Phone: Attending phys phone: [phone]          Utilization Reviews         Pneumonia - Care Day 2 (2022) by Chloe Mayo RN       Review Entered Review Status   2022 16:09 Completed      Criteria Review      Care Day: 2 Care Date: 2022 Level of Care: Inpatient Floor    Guideline Day 3    Clinical Status    ( ) * Hemodynamic stability    2022 16:09:11 EST by Carlo Hawkins      P 106, 111, 103    ( ) * Afebrile or temperature acceptable for next level of care    ( ) * Tachypnea absent    ( ) * Hypoxemia absent    ( ) * Mental status at baseline    ( ) * Antibiotic regimen acceptable for next level of care    ( ) * Discharge plans and education understood    Activity    (X) * Ambulatory or acceptable for next level of care    2022 16:09:11 EST by Russell Sahu      ACTIVITY AS TOLERATED WITH ASSISTANCE    Routes    ( ) * Oral hydration    ( ) * Oral medications or regimen acceptable for next level of care    ( ) * Oral diet or acceptable for next level of care    Interventions    ( ) * Oxygen absent or at baseline need    2022 16:09:11 EST by Russell Sahu      2 LPM VIA NC    ( ) * Isolation not indicated, or is performable at next level of care    * Milestone   Additional Notes   22      INTERNAL MEDICINE:   Subjective (22): Patient is seen at the bedside.  Reports she is feeling okay.  Denies chest pain, shortness of breath.  Continue to endorse generalized weakness. Son at the bedside.  Discussed ultrasound results with both patient and son, concern for metastatic disease.  Patient's brother  last year secondary to liver cancer.  Son appreciates to get input from oncology.  All questions were answered.       Assessment & Plan:       Metastatic disease, new diagnosis:   :    Patient with symptoms of generalized weakness, unintentional weight loss, poor oral intake and abdominal pain for 2 to 3 months   Ultrasound abdomen suggestive of metastatic liver disease.     CT chest/abdomen/pelvis with contrast ordered and showed extensive pulmonary metastasis, metastatic liver disease with potential splenic metastasiscancer   Check tumor marker CEA, CA 19-9, CEA and alpha-fetoprotein   Oncology consulted, appreciate recommendation       Community-acquired pneumonia:   Rapid Covid negative   Chest x-ray showed possible RLL  Pneumonia   Sputum culture ordered    blood cultures ordered   Start patient on ceftriaxone/azithromycin for CAP coverage   Supplemental oxygen as needed   2/5: Chest x-ray finding could be reflection of metastatic disease.  We will continue empiric antibiotic for now.  Follow-up on culture results       Diabetes mellitus:    Hold oral hypoglycemic   Start patient on sliding scale   2/5: A1c 9.6, continue sliding scale.  Start patient on Lantus 10 units nightly           Elevated troponin:   Likely demand ischemia   EKG reviewed   Patient currently asymptomatic       Debility:   PT/OT       Hypertension/hyperlipidemia:   Continue valsartan, statin and furosemide       Hypothyroidism:   Continue levothyroxine       Sarcoidosis: At home on 20 mg prednisone daily, will continue        Depression:   Continue duloxetine             Dispo/Discharge Planning: To be determined      VS:   Blood pressure (!) 149/78, pulse (!) 111, temperature 97.3 °F (36.3 °C), resp. rate 19, height 5' 6\" (1.676 m), weight 59 kg (130 lb), SpO2 93 %. 2 lpm via nc         General:          No overt distress   Head:               Normocephalic, atraumatic   Eyes:               Sclerae appear normal.  Pupils equally round. ENT:                Nares appear normal, no drainage.  Moist oral mucosa   Neck:               No restricted ROM.  Trachea midline    CV:                  RRR.  No m/r/g.  No jugular venous distension. Lungs:             Left lower lung crackles   Abdomen:        Bowel sounds present.  Soft, nontender, nondistended. Extremities:     No cyanosis or clubbing.  No edema   Skin:                No rashes and normal coloration.   Warm and dry.     Neuro:             CN II-XII grossly intact.   Sensation intact.  A&Ox3   Psych:             Normal mood and affect.           · aspirin delayed-release tablet 81 mg 81 mg Oral DAILY   · cyanocobalamin tablet 1,000 mcg 1,000 mcg Oral DAILY   · DULoxetine (CYMBALTA) capsule 60 mg 60 mg Oral DAILY   · fluticasone (FLOVENT HFA) 110 mcg inhaler 1 Puff Inhalation BID RT   · furosemide (LASIX) tablet 20 mg 20 mg Oral DAILY   · levothyroxine (SYNTHROID) tablet 50 mcg 50 mcg Oral ACB   · pantoprazole (PROTONIX) tablet 40 mg 40 mg Oral BID   · predniSONE (DELTASONE) tablet 20 mg 20 mg Oral DAILY WITH BREAKFAST   · rosuvastatin (CRESTOR) tablet 10 mg 10 mg Oral QHS   · valsartan (DIOVAN) tablet 160 mg 160 mg Oral QHS   · sodium chloride (NS) flush 5-40 mL 5-40 mL IntraVENous Q8H   · sodium chloride (NS) flush 5-40 mL 5-40 mL IntraVENous PRN   · acetaminophen (TYLENOL) tablet 650 mg 650 mg Oral Q6H PRN     Or   · acetaminophen (TYLENOL) suppository 650 mg 650 mg Rectal Q6H PRN   · polyethylene glycol (MIRALAX) packet 17 g 17 g Oral DAILY PRN   · ondansetron (ZOFRAN ODT) tablet 4 mg 4 mg Oral Q8H PRN     Or   · ondansetron (ZOFRAN) injection 4 mg 4 mg IntraVENous Q6H PRN   · enoxaparin (LOVENOX) injection 40 mg 40 mg SubCUTAneous DAILY   · cefTRIAXone (ROCEPHIN) 2 g in 0.9% sodium chloride (MBP/ADV) 50 mL MBP 2 g IntraVENous Q24H   · azithromycin (ZITHROMAX) tablet 500 mg 500 mg Oral DAILY   · insulin lispro (HUMALOG) injection SubCUTAneous AC&HS   · guaiFENesin ER (MUCINEX) tablet 600 mg 600 mg Oral BID   · LORazepam (ATIVAN) tablet 0.25 mg 0.25 mg Oral Q12H PRN                 2/5/2022 05:21   WBC: 11.9 (H)   EOSINOPHILS: 0 (L)   ABS. NEUTROPHILS: 9.0 (H)   Glucose: 219 (H)            CT CHEST ABD PELVIS:       1. Extensive pulmonary metastases with confluent mass in the right perihilar   lung base as described.       2. Multiple hepatic metastases along with potential splenic metastases       3. Adenopathy or soft tissue masses with bilateral chest walls.             HEMEONCOLOGY:   ASSESSMENT:       Principal Problem:     CAP (community acquired pneumonia) (2/4/2022)       Active Problems:     Acquired hypothyroidism (12/12/2019)         Type 2 diabetes mellitus, without long-term current use of insulin ([facility] [address] (12/12/2019)         Vocal cord paralysis (12/12/2019)         Dyslipidemia (10/8/2020)               PLAN / RECOMMENDATIONS:   Suspicious nodules in lung, liver, spleen, lymph nodes   Highly worrisome for metastatic carcinoma   She would need biopsy to characterize the origin of the malignancy further, we discussed the pros and cons of biopsy and she and her son would like to proceed   Consult IR for possible U/S guided liver biopsy   Multiple tumor markers pending, add Ca 15-3 as well       Lab studies and imaging studies (CT) were personally reviewed.  Pertinent old records were reviewed.              Pneumonia - Care Day 1 (2/4/2022) by Yadi Isidro RN       Review Entered Review Status   2/5/2022 14:30 Completed      Criteria Review      Care Day: 1 Care Date: 2/4/2022 Level of Care: Inpatient Floor    Guideline Day 1    Level Of Care    (X) ICU or floor    2/5/2022 14:25:21 EST by Sirinasima Laureano      Medical    Clinical Status    (X) * Clinical Indications met    2/5/2022 14:26:29 EST by Siri Georgi      CAP (community acquired pneumonia)    Activity    (X) Activity as tolerated    2/5/2022 14:26:29 EST by Siri Georgi      activity as tolerated with assistance    Routes    (X) Parenteral or oral medications    2/5/2022 14:29:29 EST by Siri Buttery      pantoprazole (PROTONIX) tablet 40 mg  Dose: 40 mg  Freq: 2 TIMES DAILY Route: PO    2/5/2022 14:29:15 EST by Siri Buttery      predniSONE (DELTASONE) tablet 20 mg  Dose: 20 mg  Freq: DAILY WITH BREAKFAST Route: PO    2/5/2022 14:29:09 EST by Siri Buttery      rosuvastatin (CRESTOR) tablet 10 mg  Dose: 10 mg  Freq: EVERY BEDTIME Route: PO    2/5/2022 14:29:00 EST by Siri Buttery      valsartan (DIOVAN) tablet 160 mg  Dose: 160 mg  Freq: EVERY BEDTIME Route: PO    2/5/2022 14:28:32 EST by Siri Buttery      levothyroxine (SYNTHROID) tablet 50 mcg  Dose: 50 mcg  Freq: DAILY BEFORE BREAKFAST Route: PO    2/5/2022 14:28:10 EST by Siri Buttery      insulin lispro (HUMALOG) injection  Freq: 4 TIMES DAILY BEFORE MEALS & NIGHTLY Route: SC (6 units at 2100)    2/5/2022 14:27:40 EST by Poncho Manning      guaiFENesin ER [facility]") tablet 600 mg  Dose: 600 mg  Freq: 2 TIMES DAILY Route: PO    2/5/2022 14:27:28 EST by Poncho Manning      furosemide (LASIX) tablet 20 mg  Dose: 20 mg  Freq: DAILY Route: PO    2/5/2022 14:27:16 EST by Poncho Manning      enoxaparin (LOVENOX) injection 40 mg  Dose: 40 mg  Freq: DAILY Route: SC    2/5/2022 14:27:10 EST by Poncho Manning      DULoxetine (CYMBALTA) capsule 60 mg  Dose: 60 mg  Freq: DAILY Route: PO    2/5/2022 14:27:02 EST by Poncho Manning      cyanocobalamin tablet 1,000 mcg  Dose: 1,000 mcg    2/5/2022 14:26:57 EST by Poncho Manning      aspirin delayed-release tablet 81 mg  Dose: 81 mg  Freq: DAILY Route: PO    (X) Liquid or usual diet    2/5/2022 14:26:29 EST by Poncho Manning      ADULT DIET Regular; 3 carb choices    Interventions    (X) WBC    2/5/2022 14:30:36 EST by Poncho Manning      13.3    (X) Possible ABG or oximetry    2/5/2022 14:30:36 EST by Poncho Manning      spo2 98% on 2 lpm via nc    (X) Blood culture    2/5/2022 14:30:36 EST by Poncho Manning      prelim blood culture: NO GROWTH AFTER 14 HOURS    (X) Probable oxygen    2/5/2022 14:30:51 EST by Poncho Manning      2 lpm via nc    (X) Incentive spirometry    2/5/2022 14:26:29 EST by Poncho Manning      incentive spirometry    Medications    (X) IV antibiotics    2/5/2022 14:28:46 EST by Poncho Manning      cefTRIAXone (ROCEPHIN) 2 g in 0.9% sodium chloride (MBP/ADV) 50 mL MBP  Dose: 2 g  Freq: EVERY 24 HOURS Route: IV    2/5/2022 14:26:57 EST by Poncho Manning      azithromycin (ZITHROMAX) tablet 500 mg  Dose: 500 mg  Freq: DAILY Route: PO    * Milestone   Additional Notes   2/4/22      ED HPI:   67-year-old female patient presenting with vague complaints of fatigue, weakness abdominal discomfort and swelling under her chin.  Patient states she has been feeling ill for a month at least but has worsened over the past several days. Dileep Beauchamp son is now at bedside and states that patient has lost approximately 20 pounds since Thanksgiving.  She has become increasingly fatigued and he states that he was concerned today that patient was so weak that \"she might not bounce back\".  Patient denies quantified fever but does report occasional bouts of diaphoresis.  She reports an ongoing cough and congestion related to sarcoid that she states is unchanged.  She reports no nausea or vomiting.  She reports difficulty passing stool at times but states when she does it is loose, free of bloody black or tarry appearance.  She also reports difficulty urinating at times but denies dysuria.  Patient states she is generally weak which prevents her from getting around as she normally does. Diane Nolen has been seen and followed by Dr. Holly Polanco of primary care who is ordered outpatient ultrasound secondary to elevated liver enzymes.  This scheduled for next week.          ED ROS:   Constitutional: Positive for diaphoresis and fatigue. Gastrointestinal: Positive for abdominal pain and diarrhea. Genitourinary: Positive for difficulty urinating. Neurological: Positive for weakness. VS:   BP: (!) 156/73   Pulse: (!) 110   Resp: 16   Temp: 98 °F (36.7 °C)   SpO2: 97% 2LPM via NC   Weight: 59 kg (130 lb)   Height: 5' 6\" (1.676 m            ED PHYSICAL EXAM:   BP: (!) 156/73   Pulse: (!) 110   Resp: 16   Temp: 98 °F (36.7 °C)   SpO2: 97%   Weight: 59 kg (130 lb)   Height: 5' 6\" (1.676 m               ED MEDS:   azithromycin (ZITHROMAX) 500 mg in 0.9% sodium chloride 250 mL (VIAL-MATE)   Dose: 500 mg   Freq: NOW Route: IV      cefTRIAXone (ROCEPHIN) 1 g in 0.9% sodium chloride (MBP/ADV) 50 mL MBP   Dose: 1 g   Freq: NOW Route: IV      sodium chloride 0.9 % bolus infusion 1,000 mL   Dose: 1,000 mL   Freq: ONCE Route: IV         EKG:   !! AGE AND GENDER SPECIFIC ECG ANALYSIS !! Sinus tachycardia    Nonspecific ST abnormality    No previous ECGs available             CXR:  IMPRESSION   Right lower lobar consolidation suggesting pneumonia. US ABD:   IMPRESSION   Cholelithiasis without definite ultrasound evidence of   cholecystitis. Dilated common bile duct could indicate a distally obstructing   stone or pancreatic mass. Multiple hypoechoic liver lesions on a background of   fatty liver infiltration suggest metastatic liver disease. Recommend follow-up   CT abdomen and pelvis with contrast for further assessment. CT CHEST ABD PELVIS:   IMPRESSION       1. Extensive pulmonary metastases with confluent mass in the right perihilar   lung base as described.       2. Multiple hepatic metastases along with potential splenic metastases       3. Adenopathy or soft tissue masses with bilateral chest walls.             UA:   2/4/2022 16:35   Specific gravity: 1.041 (H)   Glucose: 1,000 (A)   Ketone: 80 (A)   Bilirubin: SMALL (A)            2/4/2022 15:03   WBC: 13.3 (H)   RBC: 5.40 (H)   HGB: 15.8 (H)   HCT: 47.2 (H)   NEUTROPHILS: 89 (H)   LYMPHOCYTES: 6 (L)   EOSINOPHILS: 0 (L)   ABS. NEUTROPHILS: 11.8 (H)         2/4/2022 15:03   Sodium: 134 (L)   Glucose: 350 (H)   Albumin: 2.9 (L)   Globulin: 3.9 (H)   A-G Ratio: 0.7 (L)   Alk.  phosphatase: 280 (H)   Troponin-High Sensitivity: 46.0 (H)   NT pro-BNP: 711 (H)   Hemoglobin A1c, (calculated): 9.6 (H)      2/4/2022 17:27   Troponin-High Sensitivity: 42.7 (H)         INTERNAL MEDICINE:   Assessment & Plan:       Community-acquired pneumonia:   Rapid Covid negative   Chest x-ray showed possible RLL  Pneumonia   Sputum culture ordered    blood cultures ordered   Start patient on ceftriaxone/azithromycin for CAP coverage   Supplemental oxygen as needed       Diabetes mellitus:    Hold oral hypoglycemic   Start patient on sliding scale   Check A1c       Abdominal pain:   Ultrasound abdomen        Elevated troponin:   Likely demand ischemia   EKG reviewed   Trend troponin   Patient currently asymptomatic       Debility/poor intake/unintentional weight loss:   PT/OT   Nutrition consult       Hypertension/hyperlipidemia:   Continue valsartan, statin and furosemide       Hypothyroidism:   Continue levothyroxine       Sarcoidosis: At home on 20 mg prednisone daily, will continue        Depression:   Continue duloxetine         Dispo/Discharge Planning: Anticipate discharge in 2 to 3 days        Diet: ADULT DIET Regular; 3 carb choices (45 gm/meal)   VTE ppx: Lovenox   Code status: Full Code           Pneumonia - Clinical Indications for Admission to Inpatient Care by Linette Silva RN       Review Entered Review Status   2022 14:24 Completed      Criteria Review      Clinical Indications for Admission to Inpatient Care    Most Recent : Flaquito Benito Most Recent Date: 2022 14:24:34 EST    (X) Admission is indicated for  1 or more  of the following  [A] [B] (1) (2) (9) (10) (11):       (X) Hemodynamic instability       2022 14:24:34 EST by Flaquito Benito         Persistent tachycardia: p 110, 111, 101, 103, 106       (X) Intermediate-risk-category patient [C] (eg, Pneumonia Severity Index class III, or CURB-65       score 2) who does not improve in observation care       2022 14:24:34 EST by Lauren Faye  PSI: Score: 76 Class: III, 80 yearold female       H&P Notes       H&P by Wally Campos MD at 22 documented on ED to Hosp-Admission (Current) from 2022 in [facility]    Author: Wally Campos MD Author Type: Physician Filed: 22 1436   Note Status: Signed Cosign: Cosign Not Required Date of Service: 22   : Wally Campos MD (Physician)                        Hospitalist History and Physical   Admit Date:     2022  2:25 PM   Name:              Demetri Villaseñor   Age:                 81 y.o.   Sex:                 female  :               1935   MRN:               [id number]   Room:              HA/A     Presenting Complaint: Fatigue     Reason(s) for Admission: CAP (community acquired pneumonia) [J18.9]      History of Present Illness: Singh Molina is a 81 y.o. female with medical history of sarcoidosis, vocal cord paralysis, hypothyroidism, hypertension, type 2 diabetes mellitus presented to ED with worsening generalized weakness. Son was at bedside and reports patient symptoms started around thanksgiving and she has been progressively worsened over the past several days. She has become increasingly fatigued and unable to do her daily activities which she used to do before. Also reports abdominal pain and described as pain is wrapping around her abdomen, associated with nausea and poor oral intake. Son's report about 20 pound unintentional weight loss since Thanksgiving. Patient is following PCP who has ordered outpatient ultrasound secondary to abdominal pain. Patient endorses ongoing cough which she attributes to her sarcoidosis. Complaining of subjective fevers. Denies chest pain, palpitation. Patient is vaccinated against COVID but has not received booster yet. In the ED patient was tachycardic. Labs notable for WBC 13.3, hemoglobin 13.8, platelets 984, sodium 134, potassium 4.7, creatinine 0.74, BUN 22, troponin XX 6 and proBNP 711. Chest x-ray shows rt lower lobe pneumonia. Hospitalist consulted for admission.     Review of Systems:  10 systems reviewed and negative except as noted in HPI.   Assessment & Plan:      Community-acquired pneumonia:  Rapid Covid negative  Chest x-ray showed possible RLL  Pneumonia  Sputum culture ordered   blood cultures ordered  Start patient on ceftriaxone/azithromycin for CAP coverage  Supplemental oxygen as needed     Diabetes mellitus:   Hold oral hypoglycemic  Start patient on sliding scale  Check A1c     Abdominal pain:  Ultrasound abdomen      Elevated troponin:  Likely demand ischemia  EKG reviewed  Trend troponin  Patient currently asymptomatic     Debility/poor intake/unintentional weight loss:  PT/OT  Nutrition consult     Hypertension/hyperlipidemia:  Continue valsartan, statin and furosemide     Hypothyroidism:  Continue levothyroxine     Sarcoidosis: At home on 20 mg prednisone daily, will continue      Depression:  Continue duloxetine       Dispo/Discharge Planning: Anticipate discharge in 2 to 3 days      Diet: ADULT DIET Regular; 3 carb choices (45 gm/meal)  VTE ppx: Lovenox  Code status: Full Code                 Hospital Problems as of 2/4/2022 Date Reviewed: 12/30/2021           Codes Class Noted - Resolved POA     * (Principal) CAP (community acquired pneumonia) ICD-10-CM: J18.9  ICD-9-CM: 785   2/4/2022 - Present Unknown           Dyslipidemia ICD-10-CM: E78.5  ICD-9-CM: 272.4   10/8/2020 - Present Yes           Acquired hypothyroidism ICD-10-CM: E03.9  ICD-9-CM: 244. 9   12/12/2019 - Present Yes           Type 2 diabetes mellitus, without long-term current use of insulin (HCC) ICD-10-CM: E11.9  ICD-9-CM: 250.00   12/12/2019 - Present Yes           Vocal cord paralysis ICD-10-CM: J38.00  ICD-9-CM: 478.30   12/12/2019 - Present Yes                   Past History:       Past Medical History:   Diagnosis Date    Diabetes mellitus, type 2 ([address]      Hx of sarcoidosis      Left wrist dislocation 07/2021     no surgery, just re-set            Past Surgical History:   Procedure Laterality Date    HX CATARACT REMOVAL Bilateral      HX PARTIAL THYROIDECTOMY        IMPLANT BREAST SILICONE/EQ Bilateral       removed 2000      No Known Allergies   Social History           Tobacco Use    Smoking status: Never Smoker    Smokeless tobacco: Never Used   Substance Use Topics    Alcohol use: Never            Family History   Problem Relation Age of Onset    Heart Disease Mother      Heart Disease Father      Cancer Brother      Liver Disease Brother        Family history reviewed and negative except as noted above.          Immunization History   Administered Date(s) Administered    COVID-19, Pfizer Purple top, DILUTE for use, 12+ yrs, 30mcg/0.3mL dose 04/02/2021, 04/27/2021  Influenza High Dose Vaccine PF 10/01/2019, 10/01/2021    Influenza, Quadrivalent, Adjuvanted (>65 Yrs FLUAD QUAD [id number]) 10/27/2020      Prior to Admit Medications:       Current Outpatient Medications   Medication Instructions    acetaminophen/diphenhydramine (TYLENOL PM PO) Oral    aspirin delayed-release 81 mg tablet Oral, DAILY    cpap machine kit Does Not Apply    cyanocobalamin (VITAMIN B-12) 1,000 mcg, Oral, DAILY    DISABLED PLACARD (DISABLED PLACARD) DMV Permanent. Inability to walk without the use of, or assistance from a brace, cane, crutch, or another person.  DULoxetine (CYMBALTA) 60 mg, Oral, DAILY    fluticasone furoate (Arnuity Ellipta) 100 mcg/actuation dsdv inhaler 1 Puff, Inhalation, DAILY, RINSE MOUTH WELL AFTER USE    furosemide (LASIX) 20 mg, Oral, DAILY    guaiFENesin ER (MUCINEX) 600 mg, Oral, 2 TIMES DAILY    levothyroxine (SYNTHROID) 50 mcg, Oral, DAILY BEFORE BREAKFAST    LORazepam (ATIVAN) 0.5 mg tablet 1/2 tablet twice a day    Oxygen 3 lpm with CPAP.  pantoprazole (PROTONIX) 40 mg, Oral, 2 TIMES DAILY    pioglitazone (ACTOS) 15 mg, Oral, DAILY    potassium chloride (KLOR-CON M10) 10 mEq tablet 10 mEq, Oral, DAILY    predniSONE (DELTASONE) 10 mg tablet 40mg daily x 1 week, 30mg daily x 1 week, then 20mg daily until seen back in the office.  rosuvastatin (CRESTOR) 10 mg, Oral, EVERY BEDTIME    SITagliptin (JANUVIA) 100 mg, Oral, DAILY    valsartan (DIOVAN) 160 mg, Oral, EVERY BEDTIME         Objective:      Patient Vitals for the past 24 hrs:    Temp Pulse Resp BP SpO2   02/04/22 1424 98 °F (36.7 °C) (!) 110 16 (!) 156/73 97 %      Oxygen Therapy  O2 Sat (%): 97 % (02/04/22 1424)  O2 Device: None (Room air) (02/04/22 1424)     Estimated body mass index is 20.98 kg/m² as calculated from the following:    Height as of this encounter: 5' 6\" (1.676 m). Weight as of this encounter: 59 kg (130 lb).   No intake or output data in the 24 hours ending 02/04/22 5794    Physical Exam:     Blood pressure (!) 156/73, pulse (!) 110, temperature 98 °F (36.7 °C), resp. rate 16, height 5' 6\" (1.676 m), weight 59 kg (130 lb), SpO2 97 %. General:          No overt distress  Head:               Normocephalic, atraumatic  Eyes:               Sclerae appear normal.  Pupils equally round. ENT:                Nares appear normal, no drainage. Moist oral mucosa  Neck:               No restricted ROM. Trachea midline   CV:                  RRR. No m/r/g. No jugular venous distension. Lungs:             RT lower lung crackles  Abdomen: Bowel sounds present. Soft, nontender, nondistended. Extremities:     No cyanosis or clubbing. No edema  Skin:                No rashes and normal coloration. Warm and dry. Neuro:             CN II-XII grossly intact. Sensation intact. A&Ox3  Psych:             Normal mood and affect.       I have reviewed ordered lab tests and independently visualized imaging below:     Last 24hr Labs:         Recent Results (from the past 24 hour(s))   CBC WITH AUTOMATED DIFF     Collection Time: 02/04/22  3:03 PM   Result Value Ref Range     WBC 13.3 (H) 4.3 - 11.1 K/uL     RBC 5.40 (H) 4.05 - 5.2 M/uL     HGB 15.8 (H) 11.7 - 15.4 g/dL     HCT 47.2 (H) 35.8 - 46.3 %     MCV 87.4 79.6 - 97.8 FL     MCH 29.3 26.1 - 32.9 PG     MCHC 33.5 31.4 - 35.0 g/dL     RDW 13.5 11.9 - 14.6 %     PLATELET 329 277 - 626 K/uL     MPV 9.7 9.4 - 12.3 FL     ABSOLUTE NRBC 0.00 0.0 - 0.2 K/uL     DF AUTOMATED       NEUTROPHILS 89 (H) 43 - 78 %     LYMPHOCYTES 6 (L) 13 - 44 %     MONOCYTES 4 4.0 - 12.0 %     EOSINOPHILS 0 (L) 0.5 - 7.8 %     BASOPHILS 0 0.0 - 2.0 %     IMMATURE GRANULOCYTES 1 0.0 - 5.0 %     ABS. NEUTROPHILS 11.8 (H) 1.7 - 8.2 K/UL     ABS. LYMPHOCYTES 0.8 0.5 - 4.6 K/UL     ABS. MONOCYTES 0.5 0.1 - 1.3 K/UL     ABS. EOSINOPHILS 0.0 0.0 - 0.8 K/UL     ABS. BASOPHILS 0.0 0.0 - 0.2 K/UL     ABS. IMM.  GRANS. 0.1 0.0 - 0.5 K/UL   METABOLIC PANEL, COMPREHENSIVE   Collection Time: 02/04/22  3:03 PM   Result Value Ref Range     Sodium 134 (L) 136 - 145 mmol/L     Potassium 4.7 3.5 - 5.1 mmol/L     Chloride 98 98 - 107 mmol/L     CO2 23 21 - 32 mmol/L     Anion gap 13 7 - 16 mmol/L     Glucose 350 (H) 65 - 100 mg/dL     BUN 22 8 - 23 MG/DL     Creatinine 0.74 0.6 - 1.0 MG/DL     GFR est AA >60 >60 ml/min/1.73m2     GFR est non-AA >60 >60 ml/min/1.73m2     Calcium 9.7 8.3 - 10.4 MG/DL     Bilirubin, total 0.6 0.2 - 1.1 MG/DL     ALT (SGPT) 30 12 - 65 U/L     AST (SGOT) 29 15 - 37 U/L     Alk. phosphatase 280 (H) 50 - 130 U/L     Protein, total 6.8 6.3 - 8.2 g/dL     Albumin 2.9 (L) 3.2 - 4.6 g/dL     Globulin 3.9 (H) 2.3 - 3.5 g/dL     A-G Ratio 0.7 (L) 1.2 - 3.5     TSH 3RD GENERATION     Collection Time: 02/04/22  3:03 PM   Result Value Ref Range     TSH 0.212 uIU/mL   T4, FREE     Collection Time: 02/04/22  3:03 PM   Result Value Ref Range     T4, Free 1.3 0.78 - 1.46 NG/DL   NT-PRO BNP     Collection Time: 02/04/22  3:03 PM   Result Value Ref Range     NT pro- (H) <450 PG/ML   TROPONIN-HIGH SENSITIVITY     Collection Time: 02/04/22  3:03 PM   Result Value Ref Range     Troponin-High Sensitivity 46.0 (H) 0 - 14 pg/mL   COVID-19 RAPID TEST     Collection Time: 02/04/22  3:15 PM   Result Value Ref Range     Specimen source NASAL SWAB       COVID-19 rapid test Not detected NOTD     EKG, 12 LEAD, INITIAL     Collection Time: 02/04/22  3:15 PM   Result Value Ref Range     Ventricular Rate 105 BPM     Atrial Rate 105 BPM     P-R Interval 144 ms     QRS Duration 88 ms     Q-T Interval 322 ms     QTC Calculation (Bezet) 425 ms     Calculated P Axis 72 degrees     Calculated R Axis 63 degrees     Calculated T Axis 89 degrees     Diagnosis           !! AGE AND GENDER SPECIFIC ECG ANALYSIS !!   Sinus tachycardia  Nonspecific ST abnormality  No previous ECGs available  Confirmed by Елена Arce MD (), ADY YAO ([id number]) on 2/4/2022 5:21:04 PM      LACTIC ACID     Collection Time: 02/04/22  4:33 PM   Result Value Ref Range     Lactic acid 0.7 0.4 - 2.0 MMOL/L                  All Micro Results      Procedure Component Value Units Date/Time     CULTURE, RESPIRATORY/SPUTUM/BRONCH Donavon Kawasaki [[id number]]       Order Status: Sent Specimen: Sputum       CULTURE, BLOOD [[id number]] Collected: 02/04/22 1633     Order Status: Completed Specimen: Blood Updated: 02/04/22 1655     CULTURE, BLOOD [[id number]] Collected: 02/04/22 1635     Order Status: Completed Specimen: Blood Updated: 02/04/22 1655     COVID-19 RAPID TEST [[id number]] Collected: 02/04/22 1515     Order Status: Completed Specimen: Nasopharyngeal Updated: 02/04/22 1544       Specimen source NASAL SWAB           COVID-19 rapid test Not detected           Comment:      The specimen is NEGATIVE for SARS-CoV-2, the novel coronavirus associated with COVID-19. A negative result does not rule out COVID-19. This test has been authorized by the FDA under an Emergency Use Authorization (EUA) for use by authorized laboratories. Fact sheet for Healthcare Providers: [facility]  Fact sheet for Patients: [employer]       Methodology: Isothermal Nucleic Acid Amplification                   Other Studies:  XR CHEST PA LAT     Result Date: 2/4/2022  CHEST X-RAY, 2 views. INDICATION:  Chest pain. TECHNIQUE: PA and lateral views. COMPARISON: February 2021. FINDINGS: Lungs: Lobar consolidation right lower lobe. Left lung is clear. . Costophrenic angles: are sharp. Heart size: is normal. Pulmonary vasculature: is unremarkable. Aorta: Unremarkable. Included portion of the upper abdomen: is unremarkable. Bones: No gross bony lesions.  Other: None.      Right lower lobar consolidation suggesting pneumonia.             Medications Administered                sodium chloride 0.9 % bolus infusion 1,000 mL                Admin Date  02/04/2022 Action  New Bag Dose  1,000 mL Rate  1,000 mL/hr Route  IntraVENous Administered By  Dana Newton RN                     Signed:  Sebas Avila MD     Part of this note may have been written by using a voice dictation software.   The note has been proof read but may still contain some grammatical/other typographical errors.

## 2022-02-08 NOTE — ADT AUTH CERT NOTES
[facility]     FACILITY NPI :[id number]  FACILITY TAX ID :      [facility]  [address] [address] [phone]            DEPT CONTACT:  David Gutierrez  #[phone]  ERJ#[phone]     Patient Name :Lina Gray   : 1935 (81 yrs)  MRN : [id number]     Patient Mailing Address [address] [place] [address] [place]                                          [address] [09] , [address]              Insurance Plan Payor: BLUE CROSS MEDICARE / [place] / Product Type: Managed Care Medicare /      Primary Coverage Subscriber ID : [id number]     Secondary Coverage:  N/A      Current Patient Class : INPATIENT  Admit Date : 2022     REQUESTED LEVEL OF CARE: INPATIENT [101]                                                           Diagnosis : CAP (community acquired pneumonia)                          ICD10 Code : CAP (community acquired pneumonia) [J18.9]     Current Room and Bed 348/01     Admitting and Attending Info:  Admitting Provider : Amalia Guerrero MD   NPI: [id number]  Admitting Provider Phone. [phone]  Admitting Provider Address: [address]     Attending Provider Babita Dawkins MD   [id number]  Attending Provider Address:  [address] [place] [address]     Attending Provider Phone: Lupe dodson phone: [phone]          Utilization Reviews         Pneumonia - Care Day 2 (2022) by Alma Delia Dubois RN       Review Entered Review Status   2022 16:09 Completed      Criteria Review      Care Day: 2 Care Date: 2022 Level of Care: Inpatient Floor    Guideline Day 3    Clinical Status    ( ) * Hemodynamic stability    2022 16:09:11 EST by Sabina Camejo      P 106, 111, 103    ( ) * Afebrile or temperature acceptable for next level of care    ( ) * Tachypnea absent    ( ) * Hypoxemia absent    ( ) * Mental status at baseline    ( ) * Antibiotic regimen acceptable for next level of care    ( ) * Discharge plans and education understood    Activity    (X) * Ambulatory or acceptable for next level of care    2022 16:09:11 EST by Bashir Jackson      ACTIVITY AS TOLERATED WITH ASSISTANCE    Routes    ( ) * Oral hydration    ( ) * Oral medications or regimen acceptable for next level of care    ( ) * Oral diet or acceptable for next level of care    Interventions    ( ) * Oxygen absent or at baseline need    2022 16:09:11 EST by Bashir Jackson      2 LPM VIA NC    ( ) * Isolation not indicated, or is performable at next level of care    * Milestone   Additional Notes   22      INTERNAL MEDICINE:   Subjective (22): Patient is seen at the bedside.  Reports she is feeling okay.  Denies chest pain, shortness of breath.  Continue to endorse generalized weakness. Son at the bedside.  Discussed ultrasound results with both patient and son, concern for metastatic disease.  Patient's brother  last year secondary to liver cancer.  Son appreciates to get input from oncology.  All questions were answered.       Assessment & Plan:       Metastatic disease, new diagnosis:   :    Patient with symptoms of generalized weakness, unintentional weight loss, poor oral intake and abdominal pain for 2 to 3 months   Ultrasound abdomen suggestive of metastatic liver disease.     CT chest/abdomen/pelvis with contrast ordered and showed extensive pulmonary metastasis, metastatic liver disease with potential splenic metastasiscancer   Check tumor marker CEA, CA 19-9, CEA and alpha-fetoprotein   Oncology consulted, appreciate recommendation       Community-acquired pneumonia:   Rapid Covid negative   Chest x-ray showed possible RLL  Pneumonia   Sputum culture ordered    blood cultures ordered   Start patient on ceftriaxone/azithromycin for CAP coverage   Supplemental oxygen as needed   2/5: Chest x-ray finding could be reflection of metastatic disease.  We will continue empiric antibiotic for now.  Follow-up on culture results       Diabetes mellitus:    Hold oral hypoglycemic   Start patient on sliding scale   2/5: A1c 9.6, continue sliding scale.  Start patient on Lantus 10 units nightly           Elevated troponin:   Likely demand ischemia   EKG reviewed   Patient currently asymptomatic       Debility:   PT/OT       Hypertension/hyperlipidemia:   Continue valsartan, statin and furosemide       Hypothyroidism:   Continue levothyroxine       Sarcoidosis: At home on 20 mg prednisone daily, will continue        Depression:   Continue duloxetine             Dispo/Discharge Planning: To be determined      VS:   Blood pressure (!) 149/78, pulse (!) 111, temperature 97.3 °F (36.3 °C), resp. rate 19, height 5' 6\" (1.676 m), weight 59 kg (130 lb), SpO2 93 %. 2 lpm via nc         General:          No overt distress   Head:               Normocephalic, atraumatic   Eyes:               Sclerae appear normal.  Pupils equally round. ENT:                Nares appear normal, no drainage.  Moist oral mucosa   Neck:               No restricted ROM.  Trachea midline    CV:                  RRR.  No m/r/g.  No jugular venous distension. Lungs:             Left lower lung crackles   Abdomen:        Bowel sounds present.  Soft, nontender, nondistended. Extremities:     No cyanosis or clubbing.  No edema   Skin:                No rashes and normal coloration.   Warm and dry.     Neuro:             CN II-XII grossly intact.   Sensation intact.  A&Ox3   Psych:             Normal mood and affect.           · aspirin delayed-release tablet 81 mg 81 mg Oral DAILY   · cyanocobalamin tablet 1,000 mcg 1,000 mcg Oral DAILY   · DULoxetine (CYMBALTA) capsule 60 mg 60 mg Oral DAILY   · fluticasone (FLOVENT HFA) 110 mcg inhaler 1 Puff Inhalation BID RT   · furosemide (LASIX) tablet 20 mg 20 mg Oral DAILY   · levothyroxine (SYNTHROID) tablet 50 mcg 50 mcg Oral ACB   · pantoprazole (PROTONIX) tablet 40 mg 40 mg Oral BID   · predniSONE (DELTASONE) tablet 20 mg 20 mg Oral DAILY WITH BREAKFAST   · rosuvastatin (CRESTOR) tablet 10 mg 10 mg Oral QHS   · valsartan (DIOVAN) tablet 160 mg 160 mg Oral QHS   · sodium chloride (NS) flush 5-40 mL 5-40 mL IntraVENous Q8H   · sodium chloride (NS) flush 5-40 mL 5-40 mL IntraVENous PRN   · acetaminophen (TYLENOL) tablet 650 mg 650 mg Oral Q6H PRN     Or   · acetaminophen (TYLENOL) suppository 650 mg 650 mg Rectal Q6H PRN   · polyethylene glycol (MIRALAX) packet 17 g 17 g Oral DAILY PRN   · ondansetron (ZOFRAN ODT) tablet 4 mg 4 mg Oral Q8H PRN     Or   · ondansetron (ZOFRAN) injection 4 mg 4 mg IntraVENous Q6H PRN   · enoxaparin (LOVENOX) injection 40 mg 40 mg SubCUTAneous DAILY   · cefTRIAXone (ROCEPHIN) 2 g in 0.9% sodium chloride (MBP/ADV) 50 mL MBP 2 g IntraVENous Q24H   · azithromycin (ZITHROMAX) tablet 500 mg 500 mg Oral DAILY   · insulin lispro (HUMALOG) injection SubCUTAneous AC&HS   · guaiFENesin ER (MUCINEX) tablet 600 mg 600 mg Oral BID   · LORazepam (ATIVAN) tablet 0.25 mg 0.25 mg Oral Q12H PRN                 2/5/2022 05:21   WBC: 11.9 (H)   EOSINOPHILS: 0 (L)   ABS. NEUTROPHILS: 9.0 (H)   Glucose: 219 (H)            CT CHEST ABD PELVIS:       1. Extensive pulmonary metastases with confluent mass in the right perihilar   lung base as described.       2. Multiple hepatic metastases along with potential splenic metastases       3. Adenopathy or soft tissue masses with bilateral chest walls.             HEMEONCOLOGY:   ASSESSMENT:       Principal Problem:     CAP (community acquired pneumonia) (2/4/2022)       Active Problems:     Acquired hypothyroidism (12/12/2019)         Type 2 diabetes mellitus, without long-term current use of insulin ([facility] [address] (12/12/2019)         Vocal cord paralysis (12/12/2019)         Dyslipidemia (10/8/2020)             PLAN / RECOMMENDATIONS:   Suspicious nodules in lung, liver, spleen, lymph nodes   Highly worrisome for metastatic carcinoma   She would need biopsy to characterize the origin of the malignancy further, we discussed the pros and cons of biopsy and she and her son would like to proceed   Consult IR for possible U/S guided liver biopsy   Multiple tumor markers pending, add Ca 15-3 as well       Lab studies and imaging studies (CT) were personally reviewed.  Pertinent old records were reviewed.              Pneumonia - Care Day 1 (2/4/2022) by Ronnell Peabody, RN       Review Entered Review Status   2/5/2022 14:30 Completed      Criteria Review      Care Day: 1 Care Date: 2/4/2022 Level of Care: Inpatient Floor    Guideline Day 1    Level Of Care    (X) ICU or floor    2/5/2022 14:25:21 EST by Saji Leola      Medical    Clinical Status    (X) * Clinical Indications met    2/5/2022 14:26:29 EST by Saji Leola      CAP (community acquired pneumonia)    Activity    (X) Activity as tolerated    2/5/2022 14:26:29 EST by Saji Leola      activity as tolerated with assistance    Routes    (X) Parenteral or oral medications    2/5/2022 14:29:29 EST by Sunday Leola      pantoprazole (PROTONIX) tablet 40 mg  Dose: 40 mg  Freq: 2 TIMES DAILY Route: PO    2/5/2022 14:29:15 EST by Sunday Leola      predniSONE (DELTASONE) tablet 20 mg  Dose: 20 mg  Freq: DAILY WITH BREAKFAST Route: PO    2/5/2022 14:29:09 EST by Sunday Leola      rosuvastatin (CRESTOR) tablet 10 mg  Dose: 10 mg  Freq: EVERY BEDTIME Route: PO    2/5/2022 14:29:00 EST by Sunday Leola      valsartan (DIOVAN) tablet 160 mg  Dose: 160 mg  Freq: EVERY BEDTIME Route: PO    2/5/2022 14:28:32 EST by Sunday Leola      levothyroxine (SYNTHROID) tablet 50 mcg  Dose: 50 mcg  Freq: DAILY BEFORE BREAKFAST Route: PO    2/5/2022 14:28:10 EST by Sunday Leola      insulin lispro (HUMALOG) injection  Freq: 4 TIMES DAILY BEFORE MEALS & NIGHTLY Route: SC (6 units at 2100) 2/5/2022 14:27:40 EST by Poncho Manning      guaiFENesin ER [facility]) tablet 600 mg  Dose: 600 mg  Freq: 2 TIMES DAILY Route: PO    2/5/2022 14:27:28 EST by Poncho Manning      furosemide (LASIX) tablet 20 mg  Dose: 20 mg  Freq: DAILY Route: PO    2/5/2022 14:27:16 EST by Poncho Manning      enoxaparin (LOVENOX) injection 40 mg  Dose: 40 mg  Freq: DAILY Route: SC    2/5/2022 14:27:10 EST by Poncho Manning      DULoxetine (CYMBALTA) capsule 60 mg  Dose: 60 mg  Freq: DAILY Route: PO    2/5/2022 14:27:02 EST by Poncho Manning      cyanocobalamin tablet 1,000 mcg  Dose: 1,000 mcg    2/5/2022 14:26:57 EST by Poncho Manning      aspirin delayed-release tablet 81 mg  Dose: 81 mg  Freq: DAILY Route: PO    (X) Liquid or usual diet    2/5/2022 14:26:29 EST by Poncho Manning      ADULT DIET Regular; 3 carb choices    Interventions    (X) WBC    2/5/2022 14:30:36 EST by Poncho Manning      13.3    (X) Possible ABG or oximetry    2/5/2022 14:30:36 EST by Poncho Manning      spo2 98% on 2 lpm via nc    (X) Blood culture    2/5/2022 14:30:36 EST by Poncho Manning      prelim blood culture: NO GROWTH AFTER 14 HOURS    (X) Probable oxygen    2/5/2022 14:30:51 EST by Poncho Manning      2 lpm via nc    (X) Incentive spirometry    2/5/2022 14:26:29 EST by Poncho Manning      incentive spirometry    Medications    (X) IV antibiotics    2/5/2022 14:28:46 EST by Poncho Manning      cefTRIAXone (ROCEPHIN) 2 g in 0.9% sodium chloride (MBP/ADV) 50 mL MBP  Dose: 2 g  Freq: EVERY 24 HOURS Route: IV    2/5/2022 14:26:57 EST by Poncho Manning      azithromycin (ZITHROMAX) tablet 500 mg  Dose: 500 mg  Freq: DAILY Route: PO    * Milestone   Additional Notes   2/4/22      ED HPI:   56-year-old female patient presenting with vague complaints of fatigue, weakness abdominal discomfort and swelling under her chin.  Patient states she has been feeling ill for a month at least but has worsened over the past several days. Dileep Beauchamp son is now at bedside and states that patient has lost approximately 20 pounds since Thanksgiving.  She has become increasingly fatigued and he states that he was concerned today that patient was so weak that \"she might not bounce back\".  Patient denies quantified fever but does report occasional bouts of diaphoresis.  She reports an ongoing cough and congestion related to sarcoid that she states is unchanged.  She reports no nausea or vomiting.  She reports difficulty passing stool at times but states when she does it is loose, free of bloody black or tarry appearance.  She also reports difficulty urinating at times but denies dysuria.  Patient states she is generally weak which prevents her from getting around as she normally does. Vicki Ren has been seen and followed by Dr. Jesus Manuel Gandara of primary care who is ordered outpatient ultrasound secondary to elevated liver enzymes.  This scheduled for next week.          ED ROS:   Constitutional: Positive for diaphoresis and fatigue. Gastrointestinal: Positive for abdominal pain and diarrhea. Genitourinary: Positive for difficulty urinating. Neurological: Positive for weakness. VS:   BP: (!) 156/73   Pulse: (!) 110   Resp: 16   Temp: 98 °F (36.7 °C)   SpO2: 97% 2LPM via NC   Weight: 59 kg (130 lb)   Height: 5' 6\" (1.676 m            ED PHYSICAL EXAM:   BP: (!) 156/73   Pulse: (!) 110   Resp: 16   Temp: 98 °F (36.7 °C)   SpO2: 97%   Weight: 59 kg (130 lb)   Height: 5' 6\" (1.676 m               ED MEDS:   azithromycin (ZITHROMAX) 500 mg in 0.9% sodium chloride 250 mL (VIAL-MATE)   Dose: 500 mg   Freq: NOW Route: IV      cefTRIAXone (ROCEPHIN) 1 g in 0.9% sodium chloride (MBP/ADV) 50 mL MBP   Dose: 1 g   Freq: NOW Route: IV      sodium chloride 0.9 % bolus infusion 1,000 mL   Dose: 1,000 mL   Freq: ONCE Route: IV         EKG:   !! AGE AND GENDER SPECIFIC ECG ANALYSIS !! Sinus tachycardia    Nonspecific ST abnormality    No previous ECGs available             CXR:  IMPRESSION   Right lower lobar consolidation suggesting pneumonia. US ABD:   IMPRESSION   Cholelithiasis without definite ultrasound evidence of   cholecystitis. Dilated common bile duct could indicate a distally obstructing   stone or pancreatic mass. Multiple hypoechoic liver lesions on a background of   fatty liver infiltration suggest metastatic liver disease. Recommend follow-up   CT abdomen and pelvis with contrast for further assessment. CT CHEST ABD PELVIS:   IMPRESSION       1. Extensive pulmonary metastases with confluent mass in the right perihilar   lung base as described.       2. Multiple hepatic metastases along with potential splenic metastases       3. Adenopathy or soft tissue masses with bilateral chest walls.             UA:   2/4/2022 16:35   Specific gravity: 1.041 (H)   Glucose: 1,000 (A)   Ketone: 80 (A)   Bilirubin: SMALL (A)            2/4/2022 15:03   WBC: 13.3 (H)   RBC: 5.40 (H)   HGB: 15.8 (H)   HCT: 47.2 (H)   NEUTROPHILS: 89 (H)   LYMPHOCYTES: 6 (L)   EOSINOPHILS: 0 (L)   ABS. NEUTROPHILS: 11.8 (H)         2/4/2022 15:03   Sodium: 134 (L)   Glucose: 350 (H)   Albumin: 2.9 (L)   Globulin: 3.9 (H)   A-G Ratio: 0.7 (L)   Alk.  phosphatase: 280 (H)   Troponin-High Sensitivity: 46.0 (H)   NT pro-BNP: 711 (H)   Hemoglobin A1c, (calculated): 9.6 (H)      2/4/2022 17:27   Troponin-High Sensitivity: 42.7 (H)         INTERNAL MEDICINE:   Assessment & Plan:       Community-acquired pneumonia:   Rapid Covid negative   Chest x-ray showed possible RLL  Pneumonia   Sputum culture ordered    blood cultures ordered   Start patient on ceftriaxone/azithromycin for CAP coverage   Supplemental oxygen as needed       Diabetes mellitus:    Hold oral hypoglycemic   Start patient on sliding scale   Check A1c       Abdominal pain:   Ultrasound abdomen        Elevated troponin:   Likely demand ischemia   EKG reviewed   Trend troponin   Patient currently asymptomatic       Debility/poor intake/unintentional weight loss:   PT/OT Nutrition consult       Hypertension/hyperlipidemia:   Continue valsartan, statin and furosemide       Hypothyroidism:   Continue levothyroxine       Sarcoidosis: At home on 20 mg prednisone daily, will continue        Depression:   Continue duloxetine         Dispo/Discharge Planning: Anticipate discharge in 2 to 3 days        Diet: ADULT DIET Regular; 3 carb choices (45 gm/meal)   VTE ppx: Lovenox   Code status: Full Code           Pneumonia - Clinical Indications for Admission to Inpatient Care by Linette Silva RN       Review Entered Review Status   2022 14:24 Completed      Criteria Review      Clinical Indications for Admission to Inpatient Care    Most Recent : Flaquito Benito Most Recent Date: 2022 14:24:34 EST    (X) Admission is indicated for  1 or more  of the following  [A] [B] (1) (2) (9) (10) (11):       (X) Hemodynamic instability       2022 14:24:34 EST by Flaquito Benito         Persistent tachycardia: p 110, 111, 101, 103, 106       (X) Intermediate-risk-category patient [C] (eg, Pneumonia Severity Index class III, or CURB-65       score 2) who does not improve in observation care       2022 14:24:34 EST by Lauren Faye  PSI: Score: 76 Class: III, 82 yearold female       H&P Notes       H&P by Wally Campos MD at 22 documented on ED to Hosp-Admission (Current) from 2022 in [facility]    Author: Wally Campos MD Author Type: Physician Filed: [id number]   Note Status: Signed Cosign: Cosign Not Required Date of Service: 22   : Wally Campos MD (Physician)                        Hospitalist History and Physical   Admit Date:     2022  2:25 PM   Name:              Demetri Villaseñor   Age:                 81 y.o.   Sex:                 female  :               1935   MRN:               [id number]   Room:              HA/A     Presenting Complaint: Fatigue     Reason(s) for Admission: CAP (community acquired pneumonia) [J18.9]      History of Present Illness: Natalie Reilly is a 81 y.o. female with medical history of sarcoidosis, vocal cord paralysis, hypothyroidism, hypertension, type 2 diabetes mellitus presented to ED with worsening generalized weakness. Son was at bedside and reports patient symptoms started around thanksgiving and she has been progressively worsened over the past several days. She has become increasingly fatigued and unable to do her daily activities which she used to do before. Also reports abdominal pain and described as pain is wrapping around her abdomen, associated with nausea and poor oral intake. Son's report about 20 pound unintentional weight loss since Thanksgiving. Patient is following PCP who has ordered outpatient ultrasound secondary to abdominal pain. Patient endorses ongoing cough which she attributes to her sarcoidosis. Complaining of subjective fevers. Denies chest pain, palpitation. Patient is vaccinated against COVID but has not received booster yet. In the ED patient was tachycardic. Labs notable for WBC 13.3, hemoglobin 13.8, platelets 023, sodium 134, potassium 4.7, creatinine 0.74, BUN 22, troponin XX 6 and proBNP 711. Chest x-ray shows rt lower lobe pneumonia. Hospitalist consulted for admission.     Review of Systems:  10 systems reviewed and negative except as noted in HPI.   Assessment & Plan:      Community-acquired pneumonia:  Rapid Covid negative  Chest x-ray showed possible RLL  Pneumonia  Sputum culture ordered   blood cultures ordered  Start patient on ceftriaxone/azithromycin for CAP coverage  Supplemental oxygen as needed     Diabetes mellitus:   Hold oral hypoglycemic  Start patient on sliding scale  Check A1c     Abdominal pain:  Ultrasound abdomen      Elevated troponin:  Likely demand ischemia  EKG reviewed  Trend troponin  Patient currently asymptomatic     Debility/poor intake/unintentional weight loss:  PT/OT  Nutrition consult     Hypertension/hyperlipidemia:  Continue valsartan, statin and furosemide     Hypothyroidism:  Continue levothyroxine     Sarcoidosis: At home on 20 mg prednisone daily, will continue      Depression:  Continue duloxetine       Dispo/Discharge Planning: Anticipate discharge in 2 to 3 days      Diet: ADULT DIET Regular; 3 carb choices (45 gm/meal)  VTE ppx: Lovenox  Code status: Full Code                 Hospital Problems as of 2/4/2022 Date Reviewed: 12/30/2021           Codes Class Noted - Resolved POA     * (Principal) CAP (community acquired pneumonia) ICD-10-CM: J18.9  ICD-9-CM: 892   2/4/2022 - Present Unknown           Dyslipidemia ICD-10-CM: E78.5  ICD-9-CM: 272.4   10/8/2020 - Present Yes           Acquired hypothyroidism ICD-10-CM: E03.9  ICD-9-CM: 244. 9   12/12/2019 - Present Yes           Type 2 diabetes mellitus, without long-term current use of insulin (HCC) ICD-10-CM: E11.9  ICD-9-CM: 250.00   12/12/2019 - Present Yes           Vocal cord paralysis ICD-10-CM: J38.00  ICD-9-CM: 478.30   12/12/2019 - Present Yes                   Past History:       Past Medical History:   Diagnosis Date    Diabetes mellitus, type 2 ([facility] [address]      Hx of sarcoidosis      Left wrist dislocation 07/2021     no surgery, just re-set            Past Surgical History:   Procedure Laterality Date    HX CATARACT REMOVAL Bilateral      HX PARTIAL THYROIDECTOMY        IMPLANT BREAST SILICONE/EQ Bilateral       removed 2000      No Known Allergies   Social History           Tobacco Use    Smoking status: Never Smoker    Smokeless tobacco: Never Used   Substance Use Topics    Alcohol use: Never            Family History   Problem Relation Age of Onset    Heart Disease Mother      Heart Disease Father      Cancer Brother      Liver Disease Brother        Family history reviewed and negative except as noted above.          Immunization History   Administered Date(s) Administered    COVID-19, Pfizer Purple top, DILUTE for use, 12+ yrs, 30mcg/0.3mL dose 04/02/2021, 04/27/2021    Influenza High Dose Vaccine PF 10/01/2019, 10/01/2021    Influenza, Quadrivalent, Adjuvanted (>65 Yrs FLUAD QUAD 18690) 10/27/2020      Prior to Admit Medications:       Current Outpatient Medications   Medication Instructions    acetaminophen/diphenhydramine (TYLENOL PM PO) Oral    aspirin delayed-release 81 mg tablet Oral, DAILY    cpap machine kit Does Not Apply    cyanocobalamin (VITAMIN B-12) 1,000 mcg, Oral, DAILY    DISABLED PLACARD (DISABLED PLACARD) DMV Permanent. Inability to walk without the use of, or assistance from a brace, cane, crutch, or another person.  DULoxetine (CYMBALTA) 60 mg, Oral, DAILY    fluticasone furoate (Arnuity Ellipta) 100 mcg/actuation dsdv inhaler 1 Puff, Inhalation, DAILY, RINSE MOUTH WELL AFTER USE    furosemide (LASIX) 20 mg, Oral, DAILY    guaiFENesin ER (MUCINEX) 600 mg, Oral, 2 TIMES DAILY    levothyroxine (SYNTHROID) 50 mcg, Oral, DAILY BEFORE BREAKFAST    LORazepam (ATIVAN) 0.5 mg tablet 1/2 tablet twice a day    Oxygen 3 lpm with CPAP.  pantoprazole (PROTONIX) 40 mg, Oral, 2 TIMES DAILY    pioglitazone (ACTOS) 15 mg, Oral, DAILY    potassium chloride (KLOR-CON M10) 10 mEq tablet 10 mEq, Oral, DAILY    predniSONE (DELTASONE) 10 mg tablet 40mg daily x 1 week, 30mg daily x 1 week, then 20mg daily until seen back in the office.  rosuvastatin (CRESTOR) 10 mg, Oral, EVERY BEDTIME    SITagliptin (JANUVIA) 100 mg, Oral, DAILY    valsartan (DIOVAN) 160 mg, Oral, EVERY BEDTIME         Objective:      Patient Vitals for the past 24 hrs:    Temp Pulse Resp BP SpO2   02/04/22 1424 98 °F (36.7 °C) (!) 110 16 (!) 156/73 97 %      Oxygen Therapy  O2 Sat (%): 97 % (02/04/22 1424)  O2 Device: None (Room air) (02/04/22 1424)     Estimated body mass index is 20.98 kg/m² as calculated from the following:    Height as of this encounter: 5' 6\" (1.676 m). Weight as of this encounter: 59 kg (130 lb).   No intake or output data in the 24 hours ending 02/04/22 1724       Physical Exam:     Blood pressure (!) 156/73, pulse (!) 110, temperature 98 °F (36.7 °C), resp. rate 16, height 5' 6\" (1.676 m), weight 59 kg (130 lb), SpO2 97 %. General:          No overt distress  Head:               Normocephalic, atraumatic  Eyes:               Sclerae appear normal.  Pupils equally round. ENT:                Nares appear normal, no drainage. Moist oral mucosa  Neck:               No restricted ROM. Trachea midline   CV:                  RRR. No m/r/g. No jugular venous distension. Lungs:             RT lower lung crackles  Abdomen: Bowel sounds present. Soft, nontender, nondistended. Extremities:     No cyanosis or clubbing. No edema  Skin:                No rashes and normal coloration. Warm and dry. Neuro:             CN II-XII grossly intact. Sensation intact. A&Ox3  Psych:             Normal mood and affect.       I have reviewed ordered lab tests and independently visualized imaging below:     Last 24hr Labs:         Recent Results (from the past 24 hour(s))   CBC WITH AUTOMATED DIFF     Collection Time: 02/04/22  3:03 PM   Result Value Ref Range     WBC 13.3 (H) 4.3 - 11.1 K/uL     RBC 5.40 (H) 4.05 - 5.2 M/uL     HGB 15.8 (H) 11.7 - 15.4 g/dL     HCT 47.2 (H) 35.8 - 46.3 %     MCV 87.4 79.6 - 97.8 FL     MCH 29.3 26.1 - 32.9 PG     MCHC 33.5 31.4 - 35.0 g/dL     RDW 13.5 11.9 - 14.6 %     PLATELET 958 772 - 386 K/uL     MPV 9.7 9.4 - 12.3 FL     ABSOLUTE NRBC 0.00 0.0 - 0.2 K/uL     DF AUTOMATED       NEUTROPHILS 89 (H) 43 - 78 %     LYMPHOCYTES 6 (L) 13 - 44 %     MONOCYTES 4 4.0 - 12.0 %     EOSINOPHILS 0 (L) 0.5 - 7.8 %     BASOPHILS 0 0.0 - 2.0 %     IMMATURE GRANULOCYTES 1 0.0 - 5.0 %     ABS. NEUTROPHILS 11.8 (H) 1.7 - 8.2 K/UL     ABS. LYMPHOCYTES 0.8 0.5 - 4.6 K/UL     ABS. MONOCYTES 0.5 0.1 - 1.3 K/UL     ABS. EOSINOPHILS 0.0 0.0 - 0.8 K/UL     ABS. BASOPHILS 0.0 0.0 - 0.2 K/UL     ABS. IMM.  GRANS. 0.1 0.0 - 0.5 K/UL   METABOLIC PANEL, COMPREHENSIVE     Collection Time: 02/04/22  3:03 PM   Result Value Ref Range     Sodium 134 (L) 136 - 145 mmol/L     Potassium 4.7 3.5 - 5.1 mmol/L     Chloride 98 98 - 107 mmol/L     CO2 23 21 - 32 mmol/L     Anion gap 13 7 - 16 mmol/L     Glucose 350 (H) 65 - 100 mg/dL     BUN 22 8 - 23 MG/DL     Creatinine 0.74 0.6 - 1.0 MG/DL     GFR est AA >60 >60 ml/min/1.73m2     GFR est non-AA >60 >60 ml/min/1.73m2     Calcium 9.7 8.3 - 10.4 MG/DL     Bilirubin, total 0.6 0.2 - 1.1 MG/DL     ALT (SGPT) 30 12 - 65 U/L     AST (SGOT) 29 15 - 37 U/L     Alk. phosphatase 280 (H) 50 - 130 U/L     Protein, total 6.8 6.3 - 8.2 g/dL     Albumin 2.9 (L) 3.2 - 4.6 g/dL     Globulin 3.9 (H) 2.3 - 3.5 g/dL     A-G Ratio 0.7 (L) 1.2 - 3.5     TSH 3RD GENERATION     Collection Time: 02/04/22  3:03 PM   Result Value Ref Range     TSH 0.212 uIU/mL   T4, FREE     Collection Time: 02/04/22  3:03 PM   Result Value Ref Range     T4, Free 1.3 0.78 - 1.46 NG/DL   NT-PRO BNP     Collection Time: 02/04/22  3:03 PM   Result Value Ref Range     NT pro- (H) <450 PG/ML   TROPONIN-HIGH SENSITIVITY     Collection Time: 02/04/22  3:03 PM   Result Value Ref Range     Troponin-High Sensitivity 46.0 (H) 0 - 14 pg/mL   COVID-19 RAPID TEST     Collection Time: 02/04/22  3:15 PM   Result Value Ref Range     Specimen source NASAL SWAB       COVID-19 rapid test Not detected NOTD     EKG, 12 LEAD, INITIAL     Collection Time: 02/04/22  3:15 PM   Result Value Ref Range     Ventricular Rate 105 BPM     Atrial Rate 105 BPM     P-R Interval 144 ms     QRS Duration 88 ms     Q-T Interval 322 ms     QTC Calculation (Bezet) 425 ms     Calculated P Axis 72 degrees     Calculated R Axis 63 degrees     Calculated T Axis 89 degrees     Diagnosis           !! AGE AND GENDER SPECIFIC ECG ANALYSIS !!   Sinus tachycardia  Nonspecific ST abnormality  No previous ECGs available  Confirmed by [facility]  MD (), ADY YAO ([id number]) on 2/4/2022 5:21:04 PM      LACTIC ACID     Collection Time: 02/04/22  4:33 PM   Result Value Ref Range     Lactic acid 0.7 0.4 - 2.0 MMOL/L                  All Micro Results      Procedure Component Value Units Date/Time     CULTURE, RESPIRATORY/SPUTUM/BRONCH Elyssa Lily [[id number]]       Order Status: Sent Specimen: Sputum       CULTURE, BLOOD [[id number]] Collected: 02/04/22 1633     Order Status: Completed Specimen: Blood Updated: 02/04/22 1655     CULTURE, BLOOD [[id number]] Collected: 02/04/22 1635     Order Status: Completed Specimen: Blood Updated: 02/04/22 1655     COVID-19 RAPID TEST [[id number]] Collected: 02/04/22 1515     Order Status: Completed Specimen: Nasopharyngeal Updated: 02/04/22 1544       Specimen source NASAL SWAB           COVID-19 rapid test Not detected           Comment:      The specimen is NEGATIVE for SARS-CoV-2, the novel coronavirus associated with COVID-19. A negative result does not rule out COVID-19. This test has been authorized by the FDA under an Emergency Use Authorization (EUA) for use by authorized laboratories. Fact sheet for Healthcare Providers: [employer]  Fact sheet for Patients: [employer]       Methodology: Isothermal Nucleic Acid Amplification                   Other Studies:  XR CHEST PA LAT     Result Date: 2/4/2022  CHEST X-RAY, 2 views. INDICATION:  Chest pain. TECHNIQUE: PA and lateral views. COMPARISON: February 2021. FINDINGS: Lungs: Lobar consolidation right lower lobe. Left lung is clear. . Costophrenic angles: are sharp. Heart size: is normal. Pulmonary vasculature: is unremarkable. Aorta: Unremarkable. Included portion of the upper abdomen: is unremarkable. Bones: No gross bony lesions.  Other: None.      Right lower lobar consolidation suggesting pneumonia.             Medications Administered                sodium chloride 0.9 % bolus infusion 1,000 mL                Admin Date  02/04/2022 Action  New Bag Dose  1,000 mL Rate  1,000 mL/hr Route  IntraVENous Administered By  Cyrus Dominguez RN                     Signed:  Claudetta Potters, MD     Part of this note may have been written by using a voice dictation software.   The note has been proof read but may still contain some grammatical/other typographical errors.

## 2022-02-08 NOTE — PROGRESS NOTES
END OF SHIFT NOTE:      VITAL SIGNS  Patient Vitals for the past 12 hrs:   Temp Pulse Resp BP SpO2   02/08/22 0337 98.4 °F (36.9 °C) (!) 118 16 121/77 91 %   02/07/22 2325 98.9 °F (37.2 °C) (!) 116 18 107/67 94 %   02/07/22 2010 97.8 °F (36.6 °C) 92 17 (!) 98/51 97 %       -sputum culture needed- pt has dry cough  -pt resting in bed, vss

## 2022-02-08 NOTE — PROGRESS NOTES
Problem: Mobility Impaired (Adult and Pediatric)  Goal: *Acute Goals and Plan of Care (Insert Text)  Outcome: Progressing Towards Goal  Note: STG:  (1.)Ms. Maira Bautista will move from supine to sit and sit to supine  with STAND BY ASSIST within 1-3 treatment day(s). MET 2/8/22  (2.)Ms. Maira Bautista will transfer from bed to chair and chair to bed with MINIMAL ASSIST using the least restrictive device within 1-3 treatment day(s). MET 2/8/22  (3.)Ms. Maira Bautista will ambulate with MINIMAL ASSIST for 20 feet with the least restrictive device within 1-3 treatment day(s). LTG:  (1.)Ms. Maira Bautista will move from supine to sit and sit to supine  in bed with MODIFIED INDEPENDENCE within 4-6 treatment day(s). (2.)Ms. Maira Bautista will transfer from bed to chair and chair to bed with STAND BY ASSIST using the least restrictive device within 4-6 treatment day(s). (3.)Ms. Maira Bautista will ambulate with CONTACT GUARD ASSIST for 100 feet with the least restrictive device within 4-6 treatment day(s). ________________________________________________________________________________________________      PHYSICAL THERAPY: Daily Note and AM 2/8/2022  INPATIENT: PT Visit Days : 2  Payor: BLUE CROSS MEDICARE / Plan[place] / Product Type: Managed Care Medicare /       NAME/AGE/GENDER: Harper Chatterjee is a 81 y.o. female   PRIMARY DIAGNOSIS: CAP (community acquired pneumonia) [J18.9] CAP (community acquired pneumonia) CAP (community acquired pneumonia)       ICD-10: Treatment Diagnosis:    Generalized Muscle Weakness (M62.81)  Difficulty in walking, Not elsewhere classified (R26.2)  Other abnormalities of gait and mobility (R26.89)   Precaution/Allergies:  Patient has no known allergies. ASSESSMENT:     Ms. Maira Bautista was supine in bed on arrival. She required some encouragement to participate with PT. Bed mobility with SBA. Once sitting, she was a little dizzy but resolved after a few seconds. Sit to stand and transfer to chair with min A and RW. Pt declined any other exercises. Long talk with pt about importance of movement. She states she does not feel well and has not been eating much. Pt could benefit from STR stay but states she is going home. This section established at most recent assessment   PROBLEM LIST (Impairments causing functional limitations):  Decreased Strength  Decreased ADL/Functional Activities  Decreased Transfer Abilities  Decreased Ambulation Ability/Technique  Decreased Activity Tolerance  Increased Fatigue   INTERVENTIONS PLANNED: (Benefits and precautions of physical therapy have been discussed with the patient.)  Bed Mobility  Gait Training  Therapeutic Activites  Therapeutic Exercise/Strengthening  Transfer Training     TREATMENT PLAN: Frequency/Duration: 4-5X/ week for duration of stay  Rehabilitation Potential For Stated Goals: Good     REHAB RECOMMENDATIONS (at time of discharge pending progress):    Placement: It is my opinion, based on this patient's performance to date, that Ms. Astrid Covarrubias may benefit from [address] after discharge due to the functional deficits listed above that are likely to improve with skilled rehabilitation because he/she has multiple medical issues that affect his/her functional mobility in the community. PATIENT WOULD LIKE TO RETURN HOME with HHPT BUT WOULD BENEFIT FROM STR. Equipment:   None at this time              HISTORY:   History of Present Injury/Illness (Reason for Referral): Desmond Holt is a 81 y.o. female with medical history of sarcoidosis, vocal cord paralysis, hypothyroidism, hypertension, type 2 diabetes mellitus presented to ED with worsening generalized weakness. Son was at bedside and reports patient symptoms started around thanksgiving and she has been progressively worsened over the past several days.   She has become increasingly fatigued and unable to do her daily activities which she used to do before. Also reports abdominal pain and described as pain is wrapping around her abdomen, associated with nausea and poor oral intake. Son's report about 20 pound unintentional weight loss since Thanksgiving. Patient is following PCP who has ordered outpatient ultrasound secondary to abdominal pain. Patient endorses ongoing cough which she attributes to her sarcoidosis. Complaining of subjective fevers. Denies chest pain, palpitation. Patient is vaccinated against COVID but has not received booster yet. In the ED patient was tachycardic. Labs notable for WBC 13.3, hemoglobin 13.8, platelets 331, sodium 134, potassium 4.7, creatinine 0.74, BUN 22, troponin XX 6 and proBNP 711. Chest x-ray shows rt lower lobe pneumonia. Hospitalist consulted for admission. Past Medical History/Comorbidities:   Ms. Cole Cannon  has a past medical history of Diabetes mellitus, type 2 ([facility] [address] sarcoidosis, and Left wrist dislocation (07/2021). Ms. Cole Cannon  has a past surgical history that includes implant breast silicone/eq (Bilateral); hx partial thyroidectomy; and hx cataract removal (Bilateral). Social History/Living Environment:   Home Environment: Private residence  # Steps to Enter: 1  One/Two Story Residence: One story  Living Alone: No  Support Systems: Child(candice),Other Family Member(s)  Patient Expects to be Discharged to[de-identified] Home with home health  Current DME Used/Available at Home: Grab bars,Walker, rollator  Prior Level of Function/Work/Activity:  Ambulating short distances with rollator and CGA, family assisting with ADLs. Number of Personal Factors/Comorbidities that affect the Plan of Care: 0: LOW COMPLEXITY   EXAMINATION:   Most Recent Physical Functioning:   Gross Assessment:                  Posture:     Balance:  Sitting: Intact  Standing: Impaired;Pull to stand; With support  Standing - Static: Fair  Standing - Dynamic : Fair Bed Mobility:  Supine to Sit: Stand-by assistance  Scooting: Stand-by assistance  Wheelchair Mobility:     Transfers:  Sit to Stand: Minimum assistance  Stand to Sit: Minimum assistance  Bed to Chair: Minimum assistance  Gait:     Speed/Paty: Shuffled; Slow  Gait Abnormalities: Decreased step clearance  Distance (ft): 3 Feet (ft)  Assistive Device: Walker, rolling  Ambulation - Level of Assistance: Minimal assistance  Interventions: Safety awareness training;Verbal cues         Body Structures Involved:  Lungs  Muscles Body Functions Affected:  Respiratory  Neuromusculoskeletal  Movement Related Activities and Participation Affected:  General Tasks and Demands  Mobility   Number of elements that affect the Plan of Care: 4+: HIGH COMPLEXITY   CLINICAL PRESENTATION:   Presentation: Stable and uncomplicated: LOW COMPLEXITY   CLINICAL DECISION MAKIN[address] [place] [address] AM-PAC 6 Clicks   Basic Mobility Inpatient Short Form  How much difficulty does the patient currently have. .. Unable A Lot A Little None   1. Turning over in bed (including adjusting bedclothes, sheets and blankets)? [] 1   [] 2   [] 3   [x] 4   2. Sitting down on and standing up from a chair with arms ( e.g., wheelchair, bedside commode, etc.)   [] 1   [] 2   [x] 3   [] 4   3. Moving from lying on back to sitting on the side of the bed? [] 1   [] 2   [x] 3   [] 4   How much help from another person does the patient currently need. .. Total A Lot A Little None   4. Moving to and from a bed to a chair (including a wheelchair)? [] 1   [] 2   [x] 3   [] 4   5. Need to walk in hospital room? [] 1   [x] 2   [] 3   [] 4   6. Climbing 3-5 steps with a railing? [] 1   [x] 2   [] 3   [] 4   © 2007, Trustees of [address] [place] [address], under license to [employer]. All rights reserved      Score:  Initial: 17 Most Recent: X (Date: -- )    Interpretation of Tool:  Represents activities that are increasingly more difficult (i.e. Bed mobility, Transfers, Gait).     Medical Necessity:     Patient demonstrates   fair to good   rehab potential due to higher previous functional level. Reason for Services/Other Comments:  Patient   continues to require present interventions due to patient's inability to perform functional mobility independently  . Use of outcome tool(s) and clinical judgement create a POC that gives a: Clear prediction of patient's progress: LOW COMPLEXITY            TREATMENT:   (In addition to Assessment/Re-Assessment sessions the following treatments were rendered)   Pre-treatment Symptoms/Complaints:  Headache, fatigue   Pain: Initial:  none     Post Session:  none     Therapeutic Activity (15 Minutes): Therapeutic activity included Supine to Sit, Transfer Training, Sitting balance  and Standing balance to improve functional Mobility, Strength and Activity tolerance. Date:  2/5/22 Date:   Date:     Activity/Exercise Parameters Parameters Parameters   Ankle pumps 10     Long arc quads 10     Seated marching 10     Hip abd/adduction 10                         Braces/Orthotics/Lines/Etc:   O2 Device: None (Room air)  Treatment/Session Assessment:    Response to Treatment:  Fatigues easily   Interdisciplinary Collaboration:   Physical Therapy Assistant  Registered Nurse  After treatment position/precautions:   Up in chair  Bed/Chair-wheels locked  Call light within reach  RN notified   Compliance with Program/Exercises: Compliant most of the time, Will assess as treatment progresses  Recommendations/Intent for next treatment session: \"Next visit will focus on advancements to more challenging activities\".   Total Treatment Duration:  PT Patient Time In/Time Out  Time In: 1120  Time Out: [address], PTA

## 2022-02-08 NOTE — PROGRESS NOTES
Dr. Brianna Albrecht spoke with MARYBETH stating she met with pt and son and they prefer to go home with Hospice. [facility] would like to [facility] to their hospice services. MARYBETH spoke with Jessie Fish from [facility] who already spoke with son. Hospice will have DME requested by son [facility], hospital bed, over the bed tray and WC) and O2 delivered to the home after 3 pm today. Shivam will have a nurse to admit pt to hospice services from home the afternoon of 2/9/22. Son, Pratik Bear, requests transportation via ambulance to home. [employer] transport set for 10am on Wednesday 2/9/22. Pt will dc to home with home hospice services from [facility].   MIKALA Noel

## 2022-02-08 NOTE — ACP (ADVANCE CARE PLANNING)
[facility] Hospitalist Service  At the heart of better care     Advance Care Planning   Admit Date:  2022  2:25 PM   Name:  Judge Ge   Age:  81 y.o. Sex:  female  :  1935   MRN:  [id number]   Room:  [address] [place] [address] is able to make her own decisions: Yes    If pt unable to make decisions, POA/surrogate decision maker:  Son Neftali Longoria    Other members present in the meeting:   Son and patient    Patient / surrogate decision-maker directed:  Code Status: DNR/DNI, hospice, comfort focus care to maximize patient's quality of life    Patient or surrogate consented to discussion of the current conditions, workup, management plans, prognosis, and understand the risk for further deterioration. Time spent: >15 minutes in direct discussion (face to face and/or over phone).     Signed:  Sarah Leahy MD

## 2022-02-08 NOTE — PROGRESS NOTES
Hospitalist Progress Note   Admit Date:  2022  2:25 PM   Name:  Stephie Busch   Age:  81 y.o. Sex:  female  :  1935   MRN:  [id number]   Room:  [facility]    Presenting Complaint: Fatigue    Reason(s) for Admission: CAP (community acquired pneumonia) [J18.9]     Hospital Course & Interval History: Stephie Busch is a 81 y.o. female with medical history of sarcoidosis, vocal cord paralysis, hypothyroidism, hypertension, type 2 diabetes mellitus presented to ED with worsening generalized weakness, unintentional weight loss, poor oral intake, and abdominal pain. Symptoms started apparently in 2022. Initial ED work-up suggestive of right lower lobe pneumonia and patient started on empiric antibiotic for CAP coverage. Given GI symptoms, unintentional weight loss ultrasound abdomen performed which was suggestive of metastatic liver disease. CT chest/abdomen/pelvis with contrast ordered and showed extensive pulmonary metastasis, metastatic liver disease with potential splenic metastasis. Tumor markers ordered. Oncology consulted and recommend US guided biopsy. IR consulted and patient is status post lung biopsy on . Subjective (22): Patient is seen at the bedside. Complaining of abdominal pain and back pain. Also reports feeling weak. Reports constipation. Complaining of poor oral intake. Son at bedside. Discussed CODE STATUS with patient and son. Patient is very cleared she does not want to pursue further treatment for cancer. She only wants comfort care. Patient does not want resuscitation and opted for DNR. Patient wants home hospice. Case management consulted to arrange DME and home hospice for patient.     Assessment & Plan:     Metastatic disease, new diagnosis:  Patient with symptoms of generalized weakness, unintentional weight loss, poor oral intake and abdominal pain for 2 to 3 months  Ultrasound abdomen suggestive of metastatic liver disease. CT chest/abdomen/pelvis with contrast ordered and showed extensive pulmonary metastasis, metastatic liver disease with potential splenic metastasiscancer  Tumor markers ordered  Oncology consulted and recommend ultrasound-guided liver biopsy  IR consulted and patient is s/p lung biopsy on 2/7 2/8: Patient opted for home hospice. Case management consulted to arrange    Community-acquired pneumonia:  Rapid Covid negative  Chest x-ray showed possible RLL  Pneumonia  Sputum culture ordered   blood cultures ordered  on ceftriaxone/azithromycin for CAP coverage  Supplemental oxygen as needed    Diabetes mellitus:   Held oral hypoglycemic  Sliding scale  Continue Lantus 12 units at bedtime    Elevated troponin:  Likely demand ischemia  EKG reviewed  Patient currently asymptomatic     Debility:  PT/OT     Hypertension/hyperlipidemia:  Continue valsartan, statin and furosemide     Hypothyroidism:  Continue levothyroxine     Sarcoidosis: At home on 20 mg prednisone daily, will continue      Depression:  Continue duloxetine        Dispo/Discharge Planning:  To be determined    Diet:  ADULT ORAL NUTRITION SUPPLEMENT Lunch, Dinner, Breakfast; Standard High Calorie/High Protein  ADULT DIET Regular; 3 carb choices (45 gm/meal)  DVT PPx: Lovenox  Code status: DNR    Hospital Problems as of 2/8/2022 Date Reviewed: 12/30/2021          Codes Class Noted - Resolved POA    Moderate protein-calorie malnutrition ([facility] [address] ICD-10-CM: E44.0  ICD-9-CM: 263.0  2/7/2022 - Present Yes        * (Principal) CAP (community acquired pneumonia) ICD-10-CM: J18.9  ICD-9-CM: 907  2/4/2022 - Present Unknown        Dyslipidemia ICD-10-CM: E78.5  ICD-9-CM: 272.4  10/8/2020 - Present Yes        Acquired hypothyroidism ICD-10-CM: E03.9  ICD-9-CM: 244.9  12/12/2019 - Present Yes        Type 2 diabetes mellitus, without long-term current use of insulin ([facility] [address] ICD-10-CM: E11.9  ICD-9-CM: 250.00  12/12/2019 - Present Yes        Vocal cord paralysis ICD-10-CM: J38.00  ICD-9-CM: 478.30  12/12/2019 - Present Yes              Objective:     Patient Vitals for the past 24 hrs:   Temp Pulse Resp BP SpO2   02/08/22 1100 97.9 °F (36.6 °C) (!) 116 17 101/66 98 %   02/08/22 0723 98 °F (36.7 °C) 77 18 105/61 93 %   02/08/22 0337 98.4 °F (36.9 °C) (!) 118 16 121/77 91 %   02/07/22 2325 98.9 °F (37.2 °C) (!) 116 18 107/67 94 %   02/07/22 2010 97.8 °F (36.6 °C) 92 17 (!) 98/51 97 %   02/07/22 1552 98.1 °F (36.7 °C) (!) 111 18 (!) 127/59 97 %     Oxygen Therapy  O2 Sat (%): 98 % (02/08/22 1100)  Pulse via Oximetry: 108 beats per minute (02/05/22 0839)  O2 Device: None (Room air) (02/08/22 0743)  O2 Flow Rate (L/min): 0 l/min (02/05/22 0839)  FIO2 (%): 21 % (02/05/22 0839)    Estimated body mass index is 23.24 kg/m² as calculated from the following:    Height as of this encounter: 5' 6\" (1.676 m). Weight as of this encounter: 65.3 kg (144 lb). Intake/Output Summary (Last 24 hours) at 2/8/2022 1520  Last data filed at 2/8/2022 0836  Gross per 24 hour   Intake 480 ml   Output 300 ml   Net 180 ml         Physical Exam:     Blood pressure 101/66, pulse (!) 116, temperature 97.9 °F (36.6 °C), resp. rate 17, height 5' 6\" (1.676 m), weight 65.3 kg (144 lb), SpO2 98 %. General:    No overt distress  Head:  Normocephalic, atraumatic  Eyes:  Sclerae appear normal.  Pupils equally round. ENT:  Nares appear normal, no drainage. Moist oral mucosa  Neck:  No restricted ROM. Trachea midline   CV:   RRR. No m/r/g. No jugular venous distension. Lungs:   Left lower lung crackles  Abdomen: Bowel sounds present. Soft, nontender, nondistended. Extremities: No cyanosis or clubbing. No edema  Skin:     No rashes and normal coloration. Warm and dry. Neuro:  CN II-XII grossly intact. Sensation intact. A&Ox3  Psych:  Normal mood and affect.       I have reviewed ordered lab tests and independently visualized imaging below:    Recent Labs:  Recent Results (from the past 48 hour(s))   GLUCOSE, POC    Collection Time: 02/06/22  4:08 PM   Result Value Ref Range    Glucose (POC) 251 (H) 65 - 100 mg/dL    Performed by Gabbie    GLUCOSE, POC    Collection Time: 02/06/22  8:44 PM   Result Value Ref Range    Glucose (POC) 313 (H) 65 - 100 mg/dL    Performed by Lawrence    CBC WITH AUTOMATED DIFF    Collection Time: 02/07/22  5:50 AM   Result Value Ref Range    WBC 12.6 (H) 4.3 - 11.1 K/uL    RBC 5.20 4.05 - 5.2 M/uL    HGB 15.4 11.7 - 15.4 g/dL    HCT 45.2 35.8 - 46.3 %    MCV 86.9 79.6 - 97.8 FL    MCH 29.6 26.1 - 32.9 PG    MCHC 34.1 31.4 - 35.0 g/dL    RDW 13.6 11.9 - 14.6 %    PLATELET 814 576 - 893 K/uL    MPV 9.8 9.4 - 12.3 FL    ABSOLUTE NRBC 0.00 0.0 - 0.2 K/uL    DF AUTOMATED      NEUTROPHILS 74 43 - 78 %    LYMPHOCYTES 17 13 - 44 %    MONOCYTES 7 4.0 - 12.0 %    EOSINOPHILS 1 0.5 - 7.8 %    BASOPHILS 0 0.0 - 2.0 %    IMMATURE GRANULOCYTES 1 0.0 - 5.0 %    ABS. NEUTROPHILS 9.4 (H) 1.7 - 8.2 K/UL    ABS. LYMPHOCYTES 2.2 0.5 - 4.6 K/UL    ABS. MONOCYTES 0.9 0.1 - 1.3 K/UL    ABS. EOSINOPHILS 0.1 0.0 - 0.8 K/UL    ABS. BASOPHILS 0.0 0.0 - 0.2 K/UL    ABS. IMM.  GRANS. 0.1 0.0 - 0.5 K/UL   METABOLIC PANEL, BASIC    Collection Time: 02/07/22  5:50 AM   Result Value Ref Range    Sodium 135 (L) 136 - 145 mmol/L    Potassium 3.6 3.5 - 5.1 mmol/L    Chloride 99 98 - 107 mmol/L    CO2 31 21 - 32 mmol/L    Anion gap 5 (L) 7 - 16 mmol/L    Glucose 151 (H) 65 - 100 mg/dL    BUN 25 (H) 8 - 23 MG/DL    Creatinine 0.86 0.6 - 1.0 MG/DL    GFR est AA >60 >60 ml/min/1.73m2    GFR est non-AA >60 >60 ml/min/1.73m2    Calcium 9.5 8.3 - 10.4 MG/DL   PROTHROMBIN TIME + INR    Collection Time: 02/07/22  5:50 AM   Result Value Ref Range    Prothrombin time 14.0 12.6 - 14.5 sec    INR 1.1     GLUCOSE, POC    Collection Time: 02/07/22  7:48 AM   Result Value Ref Range    Glucose (POC) 140 (H) 65 - 100 mg/dL    Performed by Gen    GLUCOSE, POC    Collection Time: 02/07/22 12:55 PM   Result Value Ref Range    Glucose (POC) 163 (H) 65 - 100 mg/dL    Performed by Angie    GLUCOSE, POC    Collection Time: 02/07/22  4:13 PM   Result Value Ref Range    Glucose (POC) 265 (H) 65 - 100 mg/dL    Performed by Obdulia    GLUCOSE, POC    Collection Time: 02/07/22  8:39 PM   Result Value Ref Range    Glucose (POC) 81 65 - 100 mg/dL    Performed by Blanca Mejia    GLUCOSE, POC    Collection Time: 02/08/22  7:18 AM   Result Value Ref Range    Glucose (POC) 78 65 - 100 mg/dL    Performed by Violeta    CBC WITH AUTOMATED DIFF    Collection Time: 02/08/22  7:28 AM   Result Value Ref Range    WBC 11.8 (H) 4.3 - 11.1 K/uL    RBC 4.99 4.05 - 5.2 M/uL    HGB 14.6 11.7 - 15.4 g/dL    HCT 44.0 35.8 - 46.3 %    MCV 88.2 79.6 - 97.8 FL    MCH 29.3 26.1 - 32.9 PG    MCHC 33.2 31.4 - 35.0 g/dL    RDW 14.1 11.9 - 14.6 %    PLATELET 422 069 - 243 K/uL    MPV 9.7 9.4 - 12.3 FL    ABSOLUTE NRBC 0.00 0.0 - 0.2 K/uL    DF AUTOMATED      NEUTROPHILS 77 43 - 78 %    LYMPHOCYTES 15 13 - 44 %    MONOCYTES 7 4.0 - 12.0 %    EOSINOPHILS 0 (L) 0.5 - 7.8 %    BASOPHILS 0 0.0 - 2.0 %    IMMATURE GRANULOCYTES 1 0.0 - 5.0 %    ABS. NEUTROPHILS 9.0 (H) 1.7 - 8.2 K/UL    ABS. LYMPHOCYTES 1.7 0.5 - 4.6 K/UL    ABS. MONOCYTES 0.8 0.1 - 1.3 K/UL    ABS. EOSINOPHILS 0.1 0.0 - 0.8 K/UL    ABS. BASOPHILS 0.0 0.0 - 0.2 K/UL    ABS. IMM.  GRANS. 0.1 0.0 - 0.5 K/UL   METABOLIC PANEL, BASIC    Collection Time: 02/08/22  7:28 AM   Result Value Ref Range    Sodium 136 136 - 145 mmol/L    Potassium 3.6 3.5 - 5.1 mmol/L    Chloride 99 98 - 107 mmol/L    CO2 31 21 - 32 mmol/L    Anion gap 6 (L) 7 - 16 mmol/L    Glucose 86 65 - 100 mg/dL    BUN 22 8 - 23 MG/DL    Creatinine 0.76 0.6 - 1.0 MG/DL    GFR est AA >60 >60 ml/min/1.73m2    GFR est non-AA >60 >60 ml/min/1.73m2    Calcium 9.2 8.3 - 10.4 MG/DL   GLUCOSE, POC    Collection Time: 02/08/22 10:58 AM   Result Value Ref Range    Glucose (POC) 105 (H) 65 - 100 mg/dL    Performed by BrianneJennifer        All Micro Results     Procedure Component Value Units Date/Time    CULTURE, BLOOD [[id number]] Collected: 02/04/22 1633    Order Status: Completed Specimen: Blood Updated: 02/08/22 0751     Special Requests: --        RIGHT  Antecubital       Culture result: NO GROWTH 4 DAYS       CULTURE, BLOOD [[id number]] Collected: 02/04/22 1635    Order Status: Completed Specimen: Blood Updated: 02/08/22 0751     Special Requests: --        RIGHT  FOREARM       Culture result: NO GROWTH 4 DAYS       CULTURE, RESPIRATORY/SPUTUM/BRONCH Raphael Winkler [[id number]] Collected: 02/04/22 1730    Order Status: Canceled Specimen: Sputum     COVID-19 RAPID TEST [[id number]] Collected: 02/04/22 1515    Order Status: Completed Specimen: Nasopharyngeal Updated: 02/04/22 1544     Specimen source NASAL SWAB        COVID-19 rapid test Not detected        Comment:      The specimen is NEGATIVE for SARS-CoV-2, the novel coronavirus associated with COVID-19. A negative result does not rule out COVID-19. This test has been authorized by the FDA under an Emergency Use Authorization (EUA) for use by authorized laboratories. Fact sheet for Healthcare Providers: ConventionUpdate.co.nz  Fact sheet for Patients: ConventionUpdate.co.nz       Methodology: Isothermal Nucleic Acid Amplification               Other Studies:  No results found.     Current Meds:  Current Facility-Administered Medications   Medication Dose Route Frequency    polyethylene glycol (MIRALAX) packet 17 g  17 g Oral BID    [Held by provider] insulin lispro (HUMALOG) injection 4 Units  4 Units SubCUTAneous TIDAC    bisacodyL (DULCOLAX) suppository 10 mg  10 mg Rectal DAILY PRN    senna-docusate (PERICOLACE) 8.6-50 mg per tablet 1 Tablet  1 Tablet Oral BID    HYDROcodone-acetaminophen (NORCO) 7.5-325 mg per tablet 1 Tablet  1 Tablet Oral Q6H PRN    morphine injection 2 mg  2 mg IntraVENous Q6H PRN    insulin glargine (LANTUS) injection 12 Units  12 Units SubCUTAneous QHS    lip protectant (BLISTEX) ointment 1 Each  1 Each Topical PRN    aspirin delayed-release tablet 81 mg  81 mg Oral DAILY    cyanocobalamin tablet 1,000 mcg  1,000 mcg Oral DAILY    DULoxetine (CYMBALTA) capsule 60 mg  60 mg Oral DAILY    fluticasone (FLOVENT HFA) 110 mcg inhaler  1 Puff Inhalation BID RT    furosemide (LASIX) tablet 20 mg  20 mg Oral DAILY    levothyroxine (SYNTHROID) tablet 50 mcg  50 mcg Oral ACB    pantoprazole (PROTONIX) tablet 40 mg  40 mg Oral BID    predniSONE (DELTASONE) tablet 20 mg  20 mg Oral DAILY WITH BREAKFAST    rosuvastatin (CRESTOR) tablet 10 mg  10 mg Oral QHS    valsartan (DIOVAN) tablet 160 mg  160 mg Oral QHS    sodium chloride (NS) flush 5-40 mL  5-40 mL IntraVENous Q8H    sodium chloride (NS) flush 5-40 mL  5-40 mL IntraVENous PRN    acetaminophen (TYLENOL) tablet 650 mg  650 mg Oral Q6H PRN    Or    acetaminophen (TYLENOL) suppository 650 mg  650 mg Rectal Q6H PRN    polyethylene glycol (MIRALAX) packet 17 g  17 g Oral DAILY PRN    ondansetron (ZOFRAN ODT) tablet 4 mg  4 mg Oral Q8H PRN    Or    ondansetron (ZOFRAN) injection 4 mg  4 mg IntraVENous Q6H PRN    enoxaparin (LOVENOX) injection 40 mg  40 mg SubCUTAneous DAILY    cefTRIAXone (ROCEPHIN) 2 g in 0.9% sodium chloride (MBP/ADV) 50 mL MBP  2 g IntraVENous Q24H    azithromycin (ZITHROMAX) tablet 500 mg  500 mg Oral DAILY    insulin lispro (HUMALOG) injection   SubCUTAneous AC&HS    guaiFENesin ER (MUCINEX) tablet 600 mg  600 mg Oral BID    LORazepam (ATIVAN) tablet 0.25 mg  0.25 mg Oral Q12H PRN     Facility-Administered Medications Ordered in Other Encounters   Medication Dose Route Frequency    lidocaine (XYLOCAINE) 20 mg/mL (2 %) injection  mg   mg IntraDERMal Rad Multiple       Signed:  Martina Alanis MD    Part of this note may have been written by using a voice dictation software.   The note has been proof read but may still contain some grammatical/other typographical errors.

## 2022-02-09 VITALS
DIASTOLIC BLOOD PRESSURE: 73 MMHG | BODY MASS INDEX: 23.14 KG/M2 | HEART RATE: 102 BPM | TEMPERATURE: 97.4 F | WEIGHT: 144 LBS | RESPIRATION RATE: 22 BRPM | HEIGHT: 66 IN | SYSTOLIC BLOOD PRESSURE: 114 MMHG | OXYGEN SATURATION: 93 %

## 2022-02-09 LAB
ANION GAP SERPL CALC-SCNC: 6 MMOL/L (ref 7–16)
BACTERIA SPEC CULT: NORMAL
BACTERIA SPEC CULT: NORMAL
BASOPHILS # BLD: 0 K/UL (ref 0–0.2)
BASOPHILS NFR BLD: 0 % (ref 0–2)
BUN SERPL-MCNC: 17 MG/DL (ref 8–23)
CALCIUM SERPL-MCNC: 8.6 MG/DL (ref 8.3–10.4)
CHLORIDE SERPL-SCNC: 102 MMOL/L (ref 98–107)
CO2 SERPL-SCNC: 30 MMOL/L (ref 21–32)
CREAT SERPL-MCNC: 0.69 MG/DL (ref 0.6–1)
DIFFERENTIAL METHOD BLD: ABNORMAL
EOSINOPHIL # BLD: 0.1 K/UL (ref 0–0.8)
EOSINOPHIL NFR BLD: 1 % (ref 0.5–7.8)
ERYTHROCYTE [DISTWIDTH] IN BLOOD BY AUTOMATED COUNT: 14.1 % (ref 11.9–14.6)
GLUCOSE BLD STRIP.AUTO-MCNC: 58 MG/DL (ref 65–100)
GLUCOSE BLD STRIP.AUTO-MCNC: 79 MG/DL (ref 65–100)
GLUCOSE SERPL-MCNC: 64 MG/DL (ref 65–100)
HCT VFR BLD AUTO: 46.4 % (ref 35.8–46.3)
HGB BLD-MCNC: 15.5 G/DL (ref 11.7–15.4)
IMM GRANULOCYTES # BLD AUTO: 0.1 K/UL (ref 0–0.5)
IMM GRANULOCYTES NFR BLD AUTO: 1 % (ref 0–5)
LYMPHOCYTES # BLD: 2.1 K/UL (ref 0.5–4.6)
LYMPHOCYTES NFR BLD: 17 % (ref 13–44)
MCH RBC QN AUTO: 29.5 PG (ref 26.1–32.9)
MCHC RBC AUTO-ENTMCNC: 33.4 G/DL (ref 31.4–35)
MCV RBC AUTO: 88.2 FL (ref 79.6–97.8)
MONOCYTES # BLD: 0.8 K/UL (ref 0.1–1.3)
MONOCYTES NFR BLD: 6 % (ref 4–12)
NEUTS SEG # BLD: 9 K/UL (ref 1.7–8.2)
NEUTS SEG NFR BLD: 75 % (ref 43–78)
NRBC # BLD: 0 K/UL (ref 0–0.2)
PLATELET # BLD AUTO: 272 K/UL (ref 150–450)
PMV BLD AUTO: 9.7 FL (ref 9.4–12.3)
POTASSIUM SERPL-SCNC: 3.5 MMOL/L (ref 3.5–5.1)
RBC # BLD AUTO: 5.26 M/UL (ref 4.05–5.2)
SERVICE CMNT-IMP: ABNORMAL
SERVICE CMNT-IMP: NORMAL
SODIUM SERPL-SCNC: 138 MMOL/L (ref 136–145)
WBC # BLD AUTO: 12 K/UL (ref 4.3–11.1)

## 2022-02-09 PROCEDURE — 74011250637 HC RX REV CODE- 250/637: Performed by: NURSE PRACTITIONER

## 2022-02-09 PROCEDURE — APPNB15 APP NON BILLABLE TIME 0-15 MINS: Performed by: NURSE PRACTITIONER

## 2022-02-09 PROCEDURE — 36415 COLL VENOUS BLD VENIPUNCTURE: CPT

## 2022-02-09 PROCEDURE — 82962 GLUCOSE BLOOD TEST: CPT

## 2022-02-09 PROCEDURE — 80048 BASIC METABOLIC PNL TOTAL CA: CPT

## 2022-02-09 PROCEDURE — 74011250637 HC RX REV CODE- 250/637: Performed by: FAMILY MEDICINE

## 2022-02-09 PROCEDURE — 94760 N-INVAS EAR/PLS OXIMETRY 1: CPT

## 2022-02-09 PROCEDURE — 74011250636 HC RX REV CODE- 250/636: Performed by: FAMILY MEDICINE

## 2022-02-09 PROCEDURE — 85025 COMPLETE CBC W/AUTO DIFF WBC: CPT

## 2022-02-09 PROCEDURE — 74011636637 HC RX REV CODE- 636/637: Performed by: FAMILY MEDICINE

## 2022-02-09 PROCEDURE — 74011000250 HC RX REV CODE- 250: Performed by: FAMILY MEDICINE

## 2022-02-09 RX ORDER — POLYETHYLENE GLYCOL 3350 17 G/17G
17 POWDER, FOR SOLUTION ORAL 2 TIMES DAILY
Qty: 60 PACKET | Refills: 0 | Status: SHIPPED | OUTPATIENT
Start: 2022-02-09 | End: 2022-03-11

## 2022-02-09 RX ORDER — OXYCODONE AND ACETAMINOPHEN 10; 325 MG/1; MG/1
1 TABLET ORAL
Qty: 12 TABLET | Refills: 0 | Status: SHIPPED | OUTPATIENT
Start: 2022-02-09 | End: 2022-02-12

## 2022-02-09 RX ORDER — ONDANSETRON 4 MG/1
4 TABLET, ORALLY DISINTEGRATING ORAL
Qty: 20 TABLET | Refills: 0 | Status: SHIPPED | OUTPATIENT
Start: 2022-02-09 | End: 2022-02-19

## 2022-02-09 RX ORDER — AMOXICILLIN 250 MG
1 CAPSULE ORAL 2 TIMES DAILY
Qty: 30 TABLET | Refills: 0 | Status: SHIPPED | OUTPATIENT
Start: 2022-02-09

## 2022-02-09 RX ADMIN — DOCUSATE SODIUM 50 MG AND SENNOSIDES 8.6 MG 1 TABLET: 8.6; 5 TABLET, FILM COATED ORAL at 08:07

## 2022-02-09 RX ADMIN — SODIUM CHLORIDE, PRESERVATIVE FREE 10 ML: 5 INJECTION INTRAVENOUS at 05:00

## 2022-02-09 RX ADMIN — PANTOPRAZOLE SODIUM 40 MG: 40 TABLET, DELAYED RELEASE ORAL at 08:07

## 2022-02-09 RX ADMIN — FLUTICASONE PROPIONATE 1 PUFF: 110 AEROSOL, METERED RESPIRATORY (INHALATION) at 09:03

## 2022-02-09 RX ADMIN — Medication 81 MG: at 08:06

## 2022-02-09 RX ADMIN — PREDNISONE 20 MG: 20 TABLET ORAL at 08:06

## 2022-02-09 RX ADMIN — FUROSEMIDE 20 MG: 20 TABLET ORAL at 08:07

## 2022-02-09 RX ADMIN — DULOXETINE HYDROCHLORIDE 60 MG: 60 CAPSULE, DELAYED RELEASE ORAL at 08:06

## 2022-02-09 RX ADMIN — LEVOTHYROXINE SODIUM 50 MCG: 0.05 TABLET ORAL at 08:07

## 2022-02-09 RX ADMIN — LORAZEPAM 0.25 MG: 0.5 TABLET ORAL at 00:13

## 2022-02-09 RX ADMIN — AZITHROMYCIN 500 MG: 250 TABLET, FILM COATED ORAL at 08:06

## 2022-02-09 RX ADMIN — ENOXAPARIN SODIUM 40 MG: 100 INJECTION SUBCUTANEOUS at 08:06

## 2022-02-09 RX ADMIN — GUAIFENESIN 600 MG: 600 TABLET, EXTENDED RELEASE ORAL at 08:07

## 2022-02-09 RX ADMIN — Medication 1000 MCG: at 08:06

## 2022-02-09 NOTE — DISCHARGE SUMMARY
Hospitalist Discharge Summary   Admit Date:  2022  2:25 PM   DC Note date: 2022  Name:  Chantal Roberson   Age:  81 y.o. Sex:  female  :  1935   MRN:  [id number]   Room:  [facility]  PCP:  Caitlin Pantoja MD    Presenting Complaint: Fatigue    Initial Admission Diagnosis: CAP (community acquired pneumonia) [J18.9]     Problem List for this Hospitalization:  Hospital Problems as of 2022 Date Reviewed: 2021          Codes Class Noted - Resolved POA    Moderate protein-calorie malnutrition ([facility] ICD-10-CM: E44.0  ICD-9-CM: 263.0  2022 - Present Yes        * (Principal) CAP (community acquired pneumonia) ICD-10-CM: J18.9  ICD-9-CM: 486  2022 - Present Unknown        Dyslipidemia ICD-10-CM: E78.5  ICD-9-CM: 272.4  10/8/2020 - Present Yes        Acquired hypothyroidism ICD-10-CM: E03.9  ICD-9-CM: 244.9  2019 - Present Yes        Type 2 diabetes mellitus, without long-term current use of insulin ([facility] ICD-10-CM: E11.9  ICD-9-CM: 250.00  2019 - Present Yes        Vocal cord paralysis ICD-10-CM: J38.00  ICD-9-CM: 478.30  2019 - Present Yes            Did Patient have Sepsis (YES OR NO): No    HPI:  Chantal Roberson is a 81 y.o. female with medical history of sarcoidosis, vocal cord paralysis, hypothyroidism, hypertension, type 2 diabetes mellitus presented to ED with worsening generalized weakness. Son was at bedside and reports patient symptoms started around  and she has been progressively worsened over the past several days. She has become increasingly fatigued and unable to do her daily activities which she used to do before. Also reports abdominal pain and described as pain is wrapping around her abdomen, associated with nausea and poor oral intake. Son's report about 20 pound unintentional weight loss since . Patient is following PCP who has ordered outpatient ultrasound secondary to abdominal pain.   Patient endorses ongoing cough which she attributes to her sarcoidosis. Complaining of subjective fevers. Denies chest pain, palpitation. Patient is vaccinated against COVID but has not received booster yet. In the ED patient was tachycardic. Labs notable for WBC 13.3, hemoglobin 13.8, platelets 736, sodium 134, potassium 4.7, creatinine 0.74, BUN 22, troponin XX 6 and proBNP 711. Chest x-ray shows rt lower lobe pneumonia. Hospitalist consulted for admission. Hospital Course: Akira mcdermott 81 y.o. female with medical history of sarcoidosis, vocal cord paralysis, hypothyroidism, hypertension, type 2 diabetes mellitus presented to ED with worsening generalized weakness, unintentional weight loss, poor oral intake, and abdominal pain. Symptoms started apparently in October/November 2022. Initial ED work-up suggestive of right lower lobe pneumonia and patient started on empiric antibiotic for CAP coverage. Given GI symptoms, unintentional weight loss ultrasound abdomen performed which was suggestive of metastatic liver disease. CT chest/abdomen/pelvis with contrast ordered and showed extensive pulmonary metastasis, metastatic liver disease with potential splenic metastasis. Tumor markers ordered. Oncology consulted and recommend US guided biopsy. IR consulted and patient is status post lung biopsy on 2/7, pending results. Discussed CODE STATUS and palliative care with patient and son. Patient was very cleared she does not want to pursue further treatment for cancer and wants comfort focus care to maximize quality of life. Patient opted for DNR and home hospice. Case management consulted. Patient accepted by [facility] care. On the day of discharge patient was stable, no new complaint. Patient can follow up with oncology for lung biopsy results, if she wants.     Disposition: Home Hospice  Diet: ADULT ORAL NUTRITION SUPPLEMENT Lunch, Dinner, Breakfast; Standard High Calorie/High Protein  ADULT DIET Regular; 3 carb choices (45 gm/meal)  Code Status: DNR    Follow Up Orders: Follow-up Appointments   Procedures    FOLLOW UP VISIT Appointment in: 3 - 5 Days PCP Oncology for biopsy results     PCP  Oncology for biopsy results     Standing Status:   Standing     Number of Occurrences:   1     Order Specific Question:   Appointment in     Answer:   3 - 5 Days       Follow-up Information     Follow up With Specialties Details Why Contact Info    Em Stevenson MD Family Medicine Go on 2/14/2022 at 3:00pm, for [place] [id number] [id number] [facility]  [phone]          Time spent in patient discharge and coordination 40 minutes. Plan was discussed with patient and son. All questions answered. Patient was stable at time of discharge. Instructions given to call a physician or return if any concerns. Discharge Info:   Discharge Medication List as of 2/9/2022  9:17 AM      START taking these medications    Details   polyethylene glycol (MIRALAX) 17 gram packet Take 1 Packet by mouth two (2) times a day for 30 days. , Normal, Disp-60 Packet, R-0      oxyCODONE-acetaminophen (Percocet)  mg per tablet Take 1 Tablet by mouth every six (6) hours as needed for Pain for up to 3 days. Max Daily Amount: 4 Tablets. , Print, Disp-12 Tablet, R-0      ondansetron (ZOFRAN ODT) 4 mg disintegrating tablet Take 1 Tablet by mouth every eight (8) hours as needed for Nausea or Vomiting for up to 10 days. , Normal, Disp-20 Tablet, R-0      senna-docusate (PERICOLACE) 8.6-50 mg per tablet Take 1 Tablet by mouth two (2) times a day., Normal, Disp-30 Tablet, R-0         CONTINUE these medications which have NOT CHANGED    Details   SITagliptin (JANUVIA) 100 mg tablet Take 1 Tablet by mouth daily. , Normal, Disp-90 Tablet, R-0      levothyroxine (synthroid) 50 mcg tablet Take 1 Tablet by mouth Daily (before breakfast). , Normal, Disp-90 Tablet, R-0      pioglitazone (ACTOS) 15 mg tablet Take 1 Tablet by mouth daily., Normal, Disp-90 Tablet, R-0      potassium chloride (KLOR-CON M10) 10 mEq tablet Take 1 Tablet by mouth daily. , Normal, Disp-90 Tablet, R-1      guaiFENesin ER (Mucinex) 600 mg ER tablet Take 600 mg by mouth two (2) times a day., Historical Med      predniSONE (DELTASONE) 10 mg tablet 40mg daily x 1 week, 30mg daily x 1 week, then 20mg daily until seen back in the office., Normal, Disp-80 Tablet, R-1      LORazepam (ATIVAN) 0.5 mg tablet 1/2 tablet twice a day, Normal, Disp-60 Tablet, R-5      rosuvastatin (Crestor) 10 mg tablet Take 1 Tablet by mouth nightly., Normal, Disp-90 Tablet, R-1      DULoxetine (CYMBALTA) 30 mg capsule Take 2 Capsules by mouth daily. , Normal, Disp-180 Capsule, R-1      pantoprazole (PROTONIX) 40 mg tablet Take 1 Tablet by mouth two (2) times a day., Normal, Disp-180 Tablet, R-1      furosemide (LASIX) 20 mg tablet Take 1 Tablet by mouth daily. , Normal, Disp-90 Tablet, R-1      valsartan (Diovan) 160 mg tablet Take 1 Tablet by mouth nightly., Normal, Disp-90 Tablet, R-1      fluticasone furoate (Arnuity Ellipta) 100 mcg/actuation dsdv inhaler Take 1 Puff by inhalation daily. RINSE MOUTH WELL AFTER USE, Normal, Disp-1 Inhaler, R-11      DISABLED PLACARD (DISABLED PLACARD) DMV Permanent. Inability to walk without the use of, or assistance from a brace, cane, crutch, or another person. , Print, Disp-1 Each,R-0      cpap machine kit by Does Not Apply route., Historical Med      Oxygen 3 lpm with CPAP., Historical Med      aspirin delayed-release 81 mg tablet Take  by mouth daily. , Historical Med      acetaminophen/diphenhydramine (TYLENOL PM PO) Take  by mouth., Historical Med         STOP taking these medications       cyanocobalamin (VITAMIN B-12) 1,000 mcg tablet Comments:   Reason for Stopping:               Procedures done this admission:  * No surgery found *    Consults this admission:  IP CONSULT TO ONCOLOGY  IP CONSULT TO INTERVENTIONAL RADIOLOGY  IP CONSULT TO INTERVENTIONAL RADIOLOGY    Echocardiogram/EKG results:  No results found for this or any previous visit. EKG Results     Procedure 720 Value Units Date/Time    EKG 12 LEAD INITIAL [[id number]] Collected: 02/04/22 1515    Order Status: Completed Updated: 02/04/22 1721     Ventricular Rate 105 BPM      Atrial Rate 105 BPM      P-R Interval 144 ms      QRS Duration 88 ms      Q-T Interval 322 ms      QTC Calculation (Bezet) 425 ms      Calculated P Axis 72 degrees      Calculated R Axis 63 degrees      Calculated T Axis 89 degrees      Diagnosis --     !! AGE AND GENDER SPECIFIC ECG ANALYSIS !! Sinus tachycardia  Nonspecific ST abnormality  No previous ECGs available  Confirmed by [facility]  MD ()ADY ([id number]) on 2/4/2022 5:21:04 PM            Diagnostic Imaging/Tests:   US GUIDE BX ABD MASS    Result Date: 2/7/2022  Title: Ultrasound guided biopsy of a chest wall lesion. History: 28-year-old female with a lung mass, liver metastases and a chest wall lesion. :  Loyda Roque PA-C Supervising Physician: Huang Peña. Alba Ahmadi M.D. Consent: Informed written and oral consent was obtained from the patient after explanation of benefits and risks (including, but not limited to: Infection, Hemorrhage, Nerve injury) of procedure. Procedure: Maximal sterile barrier technique was used. Following routine prep and drape of the left lateral chest, a local field block with 1% lidocaine was achieved. Ultrasound evaluation of the mass was performed. Using real-time ultrasound guidance, 4 18-gauge, 2 cm core biopsies were obtained and placed in formalin. Following the biopsy, ultrasound examination demonstrates no evidence of hematoma. A dressing was applied. Complications: None. Medications: None. Findings: 2.1 cm soft tissue chest wall mass . Technically successful core biopsy of a chest wall mass. Specimen: Sent to pathology. Plan: The patient was returned her Hospital room in stable condition.        All Micro Results     Procedure Component Value Units Date/Time    CULTURE, BLOOD [[id number]] Collected: 02/04/22 1633    Order Status: Completed Specimen: Blood Updated: 02/09/22 0720     Special Requests: --        RIGHT  Antecubital       Culture result: NO GROWTH 5 DAYS       CULTURE, BLOOD [[id number]] Collected: 02/04/22 1635    Order Status: Completed Specimen: Blood Updated: 02/09/22 0720     Special Requests: --        RIGHT  FOREARM       Culture result: NO GROWTH 5 DAYS       CULTURE, RESPIRATORY/SPUTUM/BRONCH Marshal Carte STAIN [[id number]] Collected: 02/04/22 1730    Order Status: Canceled Specimen: Sputum     COVID-19 RAPID TEST [[id number]] Collected: 02/04/22 1515    Order Status: Completed Specimen: Nasopharyngeal Updated: 02/04/22 1544     Specimen source NASAL SWAB        COVID-19 rapid test Not detected        Comment:      The specimen is NEGATIVE for SARS-CoV-2, the novel coronavirus associated with COVID-19. A negative result does not rule out COVID-19. This test has been authorized by the FDA under an Emergency Use Authorization (EUA) for use by authorized laboratories.         Fact sheet for Healthcare Providers: ConventionUpdate.co.nz  Fact sheet for Patients: ConventionUpdate.co.nz       Methodology: Isothermal Nucleic Acid Amplification               Labs: Results:       BMP, Mg, Phos Recent Labs     02/09/22  0733 02/08/22  0728 02/07/22  0550    136 135*   K 3.5 3.6 3.6    99 99   CO2 30 31 31   AGAP 6* 6* 5*   BUN 17 22 25*   CREA 0.69 0.76 0.86   CA 8.6 9.2 9.5   GLU 64* 86 151*      CBC Recent Labs     02/09/22  0733 02/08/22  0728 02/07/22  0550   WBC 12.0* 11.8* 12.6*   RBC 5.26* 4.99 5.20   HGB 15.5* 14.6 15.4   HCT 46.4* 44.0 45.2    245 270   GRANS 75 77 74   LYMPH 17 15 17   EOS 1 0* 1   MONOS 6 7 7   BASOS 0 0 0   IG 1 1 1   ANEU 9.0* 9.0* 9.4*   ABL 2.1 1.7 2.2   JUAN 0.1 0.1 0.1   ABM 0.8 0.8 0.9   ABB 0.0 0.0 0.0   AIG 0.1 0.1 0.1      LFT No results for input(s): ALT, TBIL, AP, TP, ALB, GLOB, AGRAT in the last 72 hours.     No lab exists for component: SGOT, GPT   Cardiac Testing No results found for: BNPP, BNP, CPK, RCK1, RCK2, RCK3, RCK4, CKMB, CKNDX, CKND1, TROPT, TROIQ   Coagulation Tests Lab Results   Component Value Date/Time    Prothrombin time 14.0 02/07/2022 05:50 AM    INR 1.1 02/07/2022 05:50 AM      A1c Lab Results   Component Value Date/Time    Hemoglobin A1c 9.6 (H) 02/04/2022 03:03 PM    Hemoglobin A1c 6.1 (H) 09/27/2021 09:37 AM    Hemoglobin A1c 5.9 (H) 04/05/2021 01:28 PM      Lipid Panel Lab Results   Component Value Date/Time    Cholesterol, total 195 09/27/2021 09:37 AM    HDL Cholesterol 98 09/27/2021 09:37 AM    LDL, calculated 71 09/27/2021 09:37 AM    VLDL, calculated 26 09/27/2021 09:37 AM    Triglyceride 158 (H) 09/27/2021 09:37 AM      Thyroid Panel Lab Results   Component Value Date/Time    TSH 0.212 02/04/2022 03:03 PM    TSH 0.412 (L) 01/31/2022 03:43 PM    T4, Free 1.3 02/04/2022 03:03 PM    T4, Free 1.63 01/31/2022 03:43 PM        Most Recent UA Lab Results   Component Value Date/Time    Color YELLOW 02/04/2022 04:35 PM    Appearance CLEAR 02/04/2022 04:35 PM    pH (UA) 5.0 02/04/2022 04:35 PM    Protein Negative 02/04/2022 04:35 PM    Glucose 1,000 (A) 02/04/2022 04:35 PM    Ketone 80 (A) 02/04/2022 04:35 PM    Bilirubin SMALL (A) 02/04/2022 04:35 PM    Blood Negative 02/04/2022 04:35 PM    Urobilinogen 0.2 02/04/2022 04:35 PM    Nitrites Negative 02/04/2022 04:35 PM    Leukocyte Esterase Negative 02/04/2022 04:35 PM    Bacteria 0 02/04/2022 04:35 PM          All Labs from Last 24 Hrs:  Recent Results (from the past 24 hour(s))   GLUCOSE, POC    Collection Time: 02/08/22 10:58 AM   Result Value Ref Range    Glucose (POC) 105 (H) 65 - 100 mg/dL    Performed by Violeta    GLUCOSE, POC    Collection Time: 02/08/22  4:09 PM   Result Value Ref Range    Glucose (POC) 189 (H) 65 - 100 mg/dL Performed by Marisela    GLUCOSE, POC    Collection Time: 02/08/22  9:10 PM   Result Value Ref Range    Glucose (POC) 182 (H) 65 - 100 mg/dL    Performed by Chiara Arriaga    GLUCOSE, POC    Collection Time: 02/09/22  7:26 AM   Result Value Ref Range    Glucose (POC) 58 (L) 65 - 100 mg/dL    Performed by Violeta    CBC WITH AUTOMATED DIFF    Collection Time: 02/09/22  7:33 AM   Result Value Ref Range    WBC 12.0 (H) 4.3 - 11.1 K/uL    RBC 5.26 (H) 4.05 - 5.2 M/uL    HGB 15.5 (H) 11.7 - 15.4 g/dL    HCT 46.4 (H) 35.8 - 46.3 %    MCV 88.2 79.6 - 97.8 FL    MCH 29.5 26.1 - 32.9 PG    MCHC 33.4 31.4 - 35.0 g/dL    RDW 14.1 11.9 - 14.6 %    PLATELET 281 580 - 338 K/uL    MPV 9.7 9.4 - 12.3 FL    ABSOLUTE NRBC 0.00 0.0 - 0.2 K/uL    DF AUTOMATED      NEUTROPHILS 75 43 - 78 %    LYMPHOCYTES 17 13 - 44 %    MONOCYTES 6 4.0 - 12.0 %    EOSINOPHILS 1 0.5 - 7.8 %    BASOPHILS 0 0.0 - 2.0 %    IMMATURE GRANULOCYTES 1 0.0 - 5.0 %    ABS. NEUTROPHILS 9.0 (H) 1.7 - 8.2 K/UL    ABS. LYMPHOCYTES 2.1 0.5 - 4.6 K/UL    ABS. MONOCYTES 0.8 0.1 - 1.3 K/UL    ABS. EOSINOPHILS 0.1 0.0 - 0.8 K/UL    ABS. BASOPHILS 0.0 0.0 - 0.2 K/UL    ABS. IMM.  GRANS. 0.1 0.0 - 0.5 K/UL   METABOLIC PANEL, BASIC    Collection Time: 02/09/22  7:33 AM   Result Value Ref Range    Sodium 138 136 - 145 mmol/L    Potassium 3.5 3.5 - 5.1 mmol/L    Chloride 102 98 - 107 mmol/L    CO2 30 21 - 32 mmol/L    Anion gap 6 (L) 7 - 16 mmol/L    Glucose 64 (L) 65 - 100 mg/dL    BUN 17 8 - 23 MG/DL    Creatinine 0.69 0.6 - 1.0 MG/DL    GFR est AA >60 >60 ml/min/1.73m2    GFR est non-AA >60 >60 ml/min/1.73m2    Calcium 8.6 8.3 - 10.4 MG/DL   GLUCOSE, POC    Collection Time: 02/09/22  8:24 AM   Result Value Ref Range    Glucose (POC) 79 65 - 100 mg/dL    Performed by Kleber        Current Med List in Hospital:   Current Facility-Administered Medications   Medication Dose Route Frequency    polyethylene glycol (MIRALAX) packet 17 g  17 g Oral BID    [Held by provider] insulin lispro (HUMALOG) injection 4 Units  4 Units SubCUTAneous TIDAC    bisacodyL (DULCOLAX) suppository 10 mg  10 mg Rectal DAILY PRN    senna-docusate (PERICOLACE) 8.6-50 mg per tablet 1 Tablet  1 Tablet Oral BID    HYDROcodone-acetaminophen (NORCO) 7.5-325 mg per tablet 1 Tablet  1 Tablet Oral Q6H PRN    morphine injection 2 mg  2 mg IntraVENous Q6H PRN    insulin glargine (LANTUS) injection 12 Units  12 Units SubCUTAneous QHS    lip protectant (BLISTEX) ointment 1 Each  1 Each Topical PRN    aspirin delayed-release tablet 81 mg  81 mg Oral DAILY    cyanocobalamin tablet 1,000 mcg  1,000 mcg Oral DAILY    DULoxetine (CYMBALTA) capsule 60 mg  60 mg Oral DAILY    fluticasone (FLOVENT HFA) 110 mcg inhaler  1 Puff Inhalation BID RT    furosemide (LASIX) tablet 20 mg  20 mg Oral DAILY    levothyroxine (SYNTHROID) tablet 50 mcg  50 mcg Oral ACB    pantoprazole (PROTONIX) tablet 40 mg  40 mg Oral BID    predniSONE (DELTASONE) tablet 20 mg  20 mg Oral DAILY WITH BREAKFAST    rosuvastatin (CRESTOR) tablet 10 mg  10 mg Oral QHS    valsartan (DIOVAN) tablet 160 mg  160 mg Oral QHS    sodium chloride (NS) flush 5-40 mL  5-40 mL IntraVENous Q8H    sodium chloride (NS) flush 5-40 mL  5-40 mL IntraVENous PRN    acetaminophen (TYLENOL) tablet 650 mg  650 mg Oral Q6H PRN    Or    acetaminophen (TYLENOL) suppository 650 mg  650 mg Rectal Q6H PRN    polyethylene glycol (MIRALAX) packet 17 g  17 g Oral DAILY PRN    ondansetron (ZOFRAN ODT) tablet 4 mg  4 mg Oral Q8H PRN    Or    ondansetron (ZOFRAN) injection 4 mg  4 mg IntraVENous Q6H PRN    enoxaparin (LOVENOX) injection 40 mg  40 mg SubCUTAneous DAILY    cefTRIAXone (ROCEPHIN) 2 g in 0.9% sodium chloride (MBP/ADV) 50 mL MBP  2 g IntraVENous Q24H    insulin lispro (HUMALOG) injection   SubCUTAneous AC&HS    guaiFENesin ER (MUCINEX) tablet 600 mg  600 mg Oral BID    LORazepam (ATIVAN) tablet 0.25 mg  0.25 mg Oral Q12H PRN     Current Outpatient Medications   Medication Sig    polyethylene glycol (MIRALAX) 17 gram packet Take 1 Packet by mouth two (2) times a day for 30 days.  oxyCODONE-acetaminophen (Percocet)  mg per tablet Take 1 Tablet by mouth every six (6) hours as needed for Pain for up to 3 days. Max Daily Amount: 4 Tablets.  ondansetron (ZOFRAN ODT) 4 mg disintegrating tablet Take 1 Tablet by mouth every eight (8) hours as needed for Nausea or Vomiting for up to 10 days.  senna-docusate (PERICOLACE) 8.6-50 mg per tablet Take 1 Tablet by mouth two (2) times a day.  SITagliptin (JANUVIA) 100 mg tablet Take 1 Tablet by mouth daily.  levothyroxine (synthroid) 50 mcg tablet Take 1 Tablet by mouth Daily (before breakfast).  pioglitazone (ACTOS) 15 mg tablet Take 1 Tablet by mouth daily.  potassium chloride (KLOR-CON M10) 10 mEq tablet Take 1 Tablet by mouth daily.  guaiFENesin ER (Mucinex) 600 mg ER tablet Take 600 mg by mouth two (2) times a day.  predniSONE (DELTASONE) 10 mg tablet 40mg daily x 1 week, 30mg daily x 1 week, then 20mg daily until seen back in the office.  LORazepam (ATIVAN) 0.5 mg tablet 1/2 tablet twice a day    rosuvastatin (Crestor) 10 mg tablet Take 1 Tablet by mouth nightly.  DULoxetine (CYMBALTA) 30 mg capsule Take 2 Capsules by mouth daily.  pantoprazole (PROTONIX) 40 mg tablet Take 1 Tablet by mouth two (2) times a day.  furosemide (LASIX) 20 mg tablet Take 1 Tablet by mouth daily. (Patient taking differently: Take 20 mg by mouth daily as needed.)    valsartan (Diovan) 160 mg tablet Take 1 Tablet by mouth nightly.  fluticasone furoate (Arnuity Ellipta) 100 mcg/actuation dsdv inhaler Take 1 Puff by inhalation daily. RINSE MOUTH WELL AFTER USE    DISABLED PLACARD (DISABLED PLACARD) DMV Permanent. Inability to walk without the use of, or assistance from a brace, cane, crutch, or another person.  cpap machine kit by Does Not Apply route.  Oxygen 3 lpm with CPAP.     aspirin delayed-release 81 mg tablet Take  by mouth daily.  acetaminophen/diphenhydramine (TYLENOL PM PO) Take  by mouth. Facility-Administered Medications Ordered in Other Encounters   Medication Dose Route Frequency    lidocaine (XYLOCAINE) 20 mg/mL (2 %) injection  mg   mg IntraDERMal Rad Multiple       No Known Allergies  Immunization History   Administered Date(s) Administered    COVID-19, Pfizer Purple top, DILUTE for use, 12+ yrs, 30mcg/0.3mL dose 04/02/2021, 04/27/2021    Influenza High Dose Vaccine PF 10/01/2019, 10/01/2021    Influenza, Quadrivalent, Adjuvanted (>65 Yrs FLUAD QUAD [id number]) 10/27/2020       Recent Vital Data:  Patient Vitals for the past 24 hrs:   Temp Pulse Resp BP SpO2   02/09/22 0903 -- -- -- -- 93 %   02/09/22 0717 97.4 °F (36.3 °C) (!) 102 22 114/73 92 %   02/09/22 0330 97.7 °F (36.5 °C) (!) 102 16 102/69 93 %   02/09/22 0006 98 °F (36.7 °C) (!) 104 16 (!) 97/59 92 %   02/08/22 2007 98.1 °F (36.7 °C) (!) 101 18 118/70 94 %   02/08/22 1100 97.9 °F (36.6 °C) (!) 116 17 101/66 98 %     Oxygen Therapy  O2 Sat (%): 93 % (02/09/22 0903)  Pulse via Oximetry: 107 beats per minute (02/09/22 0903)  O2 Device: None (Room air) (02/09/22 0903)  O2 Flow Rate (L/min): 0 l/min (02/05/22 0839)  FIO2 (%): 21 % (02/05/22 0839)    Estimated body mass index is 23.24 kg/m² as calculated from the following:    Height as of this encounter: 5' 6\" (1.676 m). Weight as of this encounter: 65.3 kg (144 lb). Intake/Output Summary (Last 24 hours) at 2/9/2022 1057  Last data filed at 2/9/2022 0235  Gross per 24 hour   Intake 360 ml   Output 300 ml   Net 60 ml         Physical Exam:    General:    No overt distress  Head:  Normocephalic, atraumatic  Eyes:  Sclerae appear normal.  Pupils equally round. HENT:  Nares appear normal, no drainage. Moist mucous membranes  Neck:  No restricted ROM. Trachea midline  CV:   RRR. No m/r/g. No JVD  Lungs:   CTAB. No wheezing, rhonchi, or rales. Even, unlabored  Abdomen:   Soft, nontender, nondistended. Extremities: Warm and dry. No cyanosis or clubbing. No edema. Skin:     No rashes. Normal coloration  Neuro:  CN II-XII grossly intact. Psych:  Normal mood and affect. Signed:  Sheree Napoles MD    Part of this note may have been written by using a voice dictation software. The note has been proof read but may still contain some grammatical/other typographical errors.

## 2022-02-09 NOTE — PROGRESS NOTES
Care Management Interventions  PCP Verified by CM: Yes  Mode of Transport at Discharge: BLS  Transition of Care Consult (CM Consult): Discharge Planning,Home Hospice  Physical Therapy Consult: Yes  Occupational Therapy Consult: Yes  Support Systems: Spouse/Significant Other,Child(candice)  Confirm Follow Up Transport: Family  The Plan for Transition of Care is Related to the Following Treatment Goals : PT/OT/RN  The Patient and/or Patient Representative was Provided with a Choice of Provider and Agrees with the Discharge Plan?: Yes  Name of the Patient Representative Who was Provided with a Choice of Provider and Agrees with the Discharge Plan: Marixa 10 of Choice List was Provided with Basic Dialogue that Supports the Patient's Individualized Plan of Care/Goals, Treatment Preferences and Shares the Quality Data Associated with the Providers?: Yes  Discharge Location  Patient Expects to be Discharged to[de-identified] Home with hospice    Sparkle was informed pt is discharging home today via ambulance which has [facility]. Sparkle spoke with Rn who is aware. No oxygen needed at this time. Per note all DME will be arranged in the home and the [facility] Rn will meet pt and spouse later today to admit her into hospice care. No other needs iden.  Milo Cover

## 2022-02-09 NOTE — PROGRESS NOTES
Patient Vitals for the past 12 hrs:   Temp Pulse Resp BP SpO2   02/09/22 0330 97.7 °F (36.5 °C) (!) 102 16 102/69 93 %   02/09/22 0006 98 °F (36.7 °C) (!) 104 16 (!) 97/59 92 %   02/08/22 2007 98.1 °F (36.7 °C) (!) 101 18 118/70 94 %       End of shift:  -pt had one large bm  -pt resting quietly in bed, vss

## 2022-02-09 NOTE — PROGRESS NOTES
Noted pt pursuing Hospice services. We will sign off; please call if we can be of further assistance. Thank you for allowing us to participate in the care of Ms. Glendy Delacruz.       Mik Jordan NP  [facility] Hematology & On[facility]

## 2022-02-09 NOTE — DISCHARGE INSTRUCTIONS
DISCHARGE SUMMARY from Nurse    PATIENT INSTRUCTIONS:    After general anesthesia or intravenous sedation, for 24 hours or while taking prescription Narcotics:  · Limit your activities  · Do not drive and operate hazardous machinery  · Do not make important personal or business decisions  · Do  not drink alcoholic beverages  · If you have not urinated within 8 hours after discharge, please contact your surgeon on call. Report the following to your surgeon:  · Excessive pain, swelling, redness or odor of or around the surgical area  · Temperature over 100.5  · Nausea and vomiting lasting longer than 4 hours or if unable to take medications  · Any signs of decreased circulation or nerve impairment to extremity: change in color, persistent  numbness, tingling, coldness or increase pain  · Any questions    What to do at Home:  Recommended activity: Activity as tolerated,     If you experience any of the following symptoms fever,nausea,vomting,chest discomfort,shortness of brreath, please follow up with hospice care team.    *  Please give a list of your current medications to your Primary Care Provider. *  Please update this list whenever your medications are discontinued, doses are      changed, or new medications (including over-the-counter products) are added. *  Please carry medication information at all times in case of emergency situations. These are general instructions for a healthy lifestyle:    No smoking/ No tobacco products/ Avoid exposure to second hand smoke  Surgeon General's Warning:  Quitting smoking now greatly reduces serious risk to your health.     Obesity, smoking, and sedentary lifestyle greatly increases your risk for illness    A healthy diet, regular physical exercise & weight monitoring are important for maintaining a healthy lifestyle    You may be retaining fluid if you have a history of heart failure or if you experience any of the following symptoms:  Weight gain of 3 pounds or more overnight or 5 pounds in a week, increased swelling in our hands or feet or shortness of breath while lying flat in bed. Please call your doctor as soon as you notice any of these symptoms; do not wait until your next office visit. The discharge information has been reviewed with the patient. The patient verbalized understanding. Discharge medications reviewed with the patient and appropriate educational materials and side effects teaching were provided. ___________________________________________________________________________________________________________________________________  DISCHARGE SUMMARY from Nurse    PATIENT INSTRUCTIONS:  What to do at Home:  Recommended activity: Activity as tolerated    If you experience any of the following symptoms uncontrolled pain/nausea/vomiting or any questions or concerns, please follow up with Dr. Bette Ruvalcaba or the oncologist.    *  Please give a list of your current medications to your Primary Care Provider. *  Please update this list whenever your medications are discontinued, doses are      changed, or new medications (including over-the-counter products) are added. *  Please carry medication information at all times in case of emergency situations. These are general instructions for a healthy lifestyle:    No smoking/ No tobacco products/ Avoid exposure to second hand smoke  Surgeon General's Warning:  Quitting smoking now greatly reduces serious risk to your health. Obesity, smoking, and sedentary lifestyle greatly increases your risk for illness    A healthy diet, regular physical exercise & weight monitoring are important for maintaining a healthy lifestyle    You may be retaining fluid if you have a history of heart failure or if you experience any of the following symptoms:  Weight gain of 3 pounds or more overnight or 5 pounds in a week, increased swelling in our hands or feet or shortness of breath while lying flat in bed.   Please call your doctor as soon as you notice any of these symptoms; do not wait until your next office visit. The discharge information has been reviewed with the patient. The patient verbalized understanding. Discharge medications reviewed with the patient and appropriate educational materials and side effects teaching were provided.   ___________________________________________________________________________________________________________________________________